# Patient Record
Sex: MALE | Race: ASIAN | NOT HISPANIC OR LATINO | URBAN - METROPOLITAN AREA
[De-identification: names, ages, dates, MRNs, and addresses within clinical notes are randomized per-mention and may not be internally consistent; named-entity substitution may affect disease eponyms.]

---

## 2017-08-01 ENCOUNTER — OUTPATIENT (OUTPATIENT)
Dept: OUTPATIENT SERVICES | Facility: HOSPITAL | Age: 67
LOS: 1 days | Discharge: HOME | End: 2017-08-01

## 2017-08-07 DIAGNOSIS — I10 ESSENTIAL (PRIMARY) HYPERTENSION: ICD-10-CM

## 2017-08-07 DIAGNOSIS — I25.10 ATHEROSCLEROTIC HEART DISEASE OF NATIVE CORONARY ARTERY WITHOUT ANGINA PECTORIS: ICD-10-CM

## 2017-08-07 DIAGNOSIS — E78.00 PURE HYPERCHOLESTEROLEMIA, UNSPECIFIED: ICD-10-CM

## 2017-08-07 DIAGNOSIS — N52.8 OTHER MALE ERECTILE DYSFUNCTION: ICD-10-CM

## 2017-08-07 DIAGNOSIS — Z85.038 PERSONAL HISTORY OF OTHER MALIGNANT NEOPLASM OF LARGE INTESTINE: ICD-10-CM

## 2017-08-07 DIAGNOSIS — N48.6 INDURATION PENIS PLASTICA: ICD-10-CM

## 2017-08-07 DIAGNOSIS — E11.9 TYPE 2 DIABETES MELLITUS WITHOUT COMPLICATIONS: ICD-10-CM

## 2017-08-07 DIAGNOSIS — Z87.891 PERSONAL HISTORY OF NICOTINE DEPENDENCE: ICD-10-CM

## 2018-10-08 ENCOUNTER — EMERGENCY (EMERGENCY)
Facility: HOSPITAL | Age: 68
LOS: 1 days | Discharge: HOME | End: 2018-10-08
Attending: EMERGENCY MEDICINE
Payer: MEDICARE

## 2018-10-08 VITALS
SYSTOLIC BLOOD PRESSURE: 171 MMHG | WEIGHT: 160.06 LBS | DIASTOLIC BLOOD PRESSURE: 89 MMHG | HEART RATE: 54 BPM | TEMPERATURE: 98 F | HEIGHT: 64 IN | OXYGEN SATURATION: 95 % | RESPIRATION RATE: 18 BRPM

## 2018-10-08 VITALS
DIASTOLIC BLOOD PRESSURE: 90 MMHG | RESPIRATION RATE: 16 BRPM | HEART RATE: 54 BPM | OXYGEN SATURATION: 98 % | TEMPERATURE: 98 F | SYSTOLIC BLOOD PRESSURE: 165 MMHG

## 2018-10-08 PROCEDURE — 72125 CT NECK SPINE W/O DYE: CPT

## 2018-10-08 PROCEDURE — 70450 CT HEAD/BRAIN W/O DYE: CPT

## 2018-10-08 PROCEDURE — 96372 THER/PROPH/DIAG INJ SC/IM: CPT

## 2018-10-08 PROCEDURE — 99284 EMERGENCY DEPT VISIT MOD MDM: CPT | Mod: 25

## 2018-10-08 PROCEDURE — 70450 CT HEAD/BRAIN W/O DYE: CPT | Mod: 26

## 2018-10-08 PROCEDURE — 99284 EMERGENCY DEPT VISIT MOD MDM: CPT

## 2018-10-08 PROCEDURE — 72125 CT NECK SPINE W/O DYE: CPT | Mod: 26

## 2018-10-08 RX ORDER — ACETAMINOPHEN 500 MG
975 TABLET ORAL ONCE
Qty: 0 | Refills: 0 | Status: COMPLETED | OUTPATIENT
Start: 2018-10-08 | End: 2018-10-08

## 2018-10-08 RX ORDER — KETOROLAC TROMETHAMINE 30 MG/ML
15 SYRINGE (ML) INJECTION ONCE
Qty: 0 | Refills: 0 | Status: DISCONTINUED | OUTPATIENT
Start: 2018-10-08 | End: 2018-10-08

## 2018-10-08 RX ADMIN — Medication 975 MILLIGRAM(S): at 14:52

## 2018-10-08 RX ADMIN — Medication 15 MILLIGRAM(S): at 17:08

## 2018-10-08 NOTE — ED ADULT NURSE NOTE - NSIMPLEMENTINTERV_GEN_ALL_ED
Implemented All Fall Risk Interventions:  [place] to call system. Call bell, personal items and telephone within reach. Instruct patient to call for assistance. Room bathroom lighting operational. Non-slip footwear when patient is off stretcher. Physically safe environment: no spills, clutter or unnecessary equipment. Stretcher in lowest position, wheels locked, appropriate side rails in place. Provide visual cue, wrist band, yellow gown, etc. Monitor gait and stability. Monitor for mental status changes and reorient to person, place, and time. Review medications for side effects contributing to fall risk. Reinforce activity limits and safety measures with patient and family.

## 2018-10-08 NOTE — ED PROVIDER NOTE - PLAN OF CARE
1) Please return to the ED should you have any new or worsening symptoms, worsening pain, develop any concerning symptoms  2) Please follow up with your primary care doctor in 2-3 days.

## 2018-10-08 NOTE — ED ADULT NURSE NOTE - OBJECTIVE STATEMENT
66 y/o male presenting to the ED via EMS form home complaining of headache and shoulder pain s/p fall. Patient states was playing tennis when he tripped and fell back hitting his head. Patient unsure of LOC. Patient takes 81 mg of aspirin daily. Patient was able to drive home after incident but pain became worse prompting ED visit. On arrival patient appears to be resting comfortably in stretcher. Pain 4/10 in posterior head and bilateral shoulder. No hematoma noted to posterior head. Denies chest pain, dizziness, vision changes, SOB, n/v/d, numbness, tingling. A&OX3. Safety and comfort measures provided. Spouse at bedside. 66 y/o male presenting to the ED via EMS form home complaining of headache and shoulder pain s/p fall. Patient states was playing tennis when he tripped and fell back hitting his head. Patient unsure of LOC. Patient takes 81 mg of aspirin daily. Patient was able to drive home after incident but pain became worse prompting ED visit. On arrival patient appears to be resting comfortably in stretcher. Pain 4/10 in posterior head and bilateral shoulder. No hematoma noted to posterior head. Denies chest pain, dizziness, vision changes, SOB, n/v/d, numbness, tingling. C-collar put in place. A&OX3. Safety and comfort measures provided. Spouse at bedside.

## 2018-10-08 NOTE — ED ADULT TRIAGE NOTE - CHIEF COMPLAINT QUOTE
s/p mechanical fall around 11am today, hit head/ c/o dizziness and headache, denies LOC/ takes aspirin

## 2018-10-08 NOTE — ED ADULT NURSE REASSESSMENT NOTE - NS ED NURSE REASSESS COMMENT FT1
Patient ambulated in ED. Steady gait noted. Patient complaining of pain soreness in neck. Patient medicated at this time. ED MD Marciano CHANDLER states okay to discharge patient at this time. A&OX3. Safety and comfort measures provided. Spouse at bedside.

## 2018-10-08 NOTE — ED PROVIDER NOTE - MUSCULOSKELETAL, MLM
Strength appropriate for age. Full active range of motion of all 4 extremities. Tenderness over c3-4 - placed in C-collar.

## 2018-10-08 NOTE — ED PROVIDER NOTE - CARE PLAN
Principal Discharge DX:	Neck pain  Assessment and plan of treatment:	1) Please return to the ED should you have any new or worsening symptoms, worsening pain, develop any concerning symptoms  2) Please follow up with your primary care doctor in 2-3 days.

## 2018-10-08 NOTE — ED PROVIDER NOTE - MEDICAL DECISION MAKING DETAILS
Marciano Travis (Resident): 66 y/o w/ mechanical fall from standing, fell backwards - unknown LOC - headache and dizziness at this tiem w/ C-spine tenderness - will place C-collar, CT head and C- spine and

## 2018-10-08 NOTE — ED PROVIDER NOTE - OBJECTIVE STATEMENT
68 y/o male hx of HTN on ASA (last dose last night) presents with head injury. Was playing tennis at 11:00 am and fell backwards, unknown LOC. States came in now b/c of headache and neck pain. No N/v/D, abd pain, vision changes. No Cp or palpitations prior to syncope.

## 2018-10-08 NOTE — ED PROVIDER NOTE - ATTENDING CONTRIBUTION TO CARE
Dr. Lay (Attending Physician)  Minor head injury with fall from standing, mechanical, with cervical pain with paraspinal tenderness >midline tenderness.  Will CT head and neck. Pain control and reassess.

## 2018-11-06 NOTE — ED PROVIDER NOTE - EYES, MLM
How Severe Is Your Condition?: moderate Clear bilaterally, pupils equal, round and reactive to light. EOMI. No scleral icterus. No conjunctival injection. No discharge bilaterally. No nystagmus appreciated bilaterally.

## 2021-03-02 NOTE — ED ADULT NURSE NOTE - CHPI ED NUR SEVERITY2
A ([employer] Certitude Introducer Sheath Set Ref# [id number], 18 F, [employer]) catheter was used to engage and inject the right subclavian artery by hand injection.   X3, 30 total  PAIN SCALE 4 OF 10.

## 2023-05-22 ENCOUNTER — INPATIENT (INPATIENT)
Facility: HOSPITAL | Age: 73
LOS: 3 days | Discharge: HOME | DRG: 711 | End: 2023-05-26
Attending: UROLOGY | Admitting: UROLOGY
Payer: MEDICARE

## 2023-05-22 VITALS
TEMPERATURE: 98 F | HEIGHT: 64 IN | DIASTOLIC BLOOD PRESSURE: 78 MMHG | OXYGEN SATURATION: 98 % | SYSTOLIC BLOOD PRESSURE: 116 MMHG | RESPIRATION RATE: 20 BRPM | HEART RATE: 68 BPM | WEIGHT: 130.07 LBS

## 2023-05-22 DIAGNOSIS — Z96.89 PRESENCE OF OTHER SPECIFIED FUNCTIONAL IMPLANTS: Chronic | ICD-10-CM

## 2023-05-22 DIAGNOSIS — N43.1 INFECTED HYDROCELE: ICD-10-CM

## 2023-05-22 LAB
ALBUMIN SERPL ELPH-MCNC: 3.8 G/DL — SIGNIFICANT CHANGE UP (ref 3.3–5)
ALP SERPL-CCNC: 59 U/L — SIGNIFICANT CHANGE UP (ref 40–120)
ALT FLD-CCNC: 17 U/L — SIGNIFICANT CHANGE UP (ref 10–45)
ANION GAP SERPL CALC-SCNC: 16 MMOL/L — SIGNIFICANT CHANGE UP (ref 5–17)
APPEARANCE UR: CLEAR — SIGNIFICANT CHANGE UP
APTT BLD: 29.2 SEC — SIGNIFICANT CHANGE UP (ref 27.5–35.5)
AST SERPL-CCNC: 16 U/L — SIGNIFICANT CHANGE UP (ref 10–40)
BASE EXCESS BLDV CALC-SCNC: 2.1 MMOL/L — SIGNIFICANT CHANGE UP (ref -2–3)
BASE EXCESS BLDV CALC-SCNC: 3.4 MMOL/L — HIGH (ref -2–3)
BASOPHILS # BLD AUTO: 0.04 K/UL — SIGNIFICANT CHANGE UP (ref 0–0.2)
BASOPHILS NFR BLD AUTO: 0.5 % — SIGNIFICANT CHANGE UP (ref 0–2)
BILIRUB SERPL-MCNC: 0.2 MG/DL — SIGNIFICANT CHANGE UP (ref 0.2–1.2)
BILIRUB UR-MCNC: NEGATIVE — SIGNIFICANT CHANGE UP
BUN SERPL-MCNC: 26 MG/DL — HIGH (ref 7–23)
CA-I SERPL-SCNC: 1.2 MMOL/L — SIGNIFICANT CHANGE UP (ref 1.15–1.33)
CA-I SERPL-SCNC: 1.23 MMOL/L — SIGNIFICANT CHANGE UP (ref 1.15–1.33)
CALCIUM SERPL-MCNC: 9.4 MG/DL — SIGNIFICANT CHANGE UP (ref 8.4–10.5)
CHLORIDE BLDV-SCNC: 101 MMOL/L — SIGNIFICANT CHANGE UP (ref 96–108)
CHLORIDE BLDV-SCNC: 102 MMOL/L — SIGNIFICANT CHANGE UP (ref 96–108)
CHLORIDE SERPL-SCNC: 99 MMOL/L — SIGNIFICANT CHANGE UP (ref 96–108)
CO2 BLDV-SCNC: 29 MMOL/L — HIGH (ref 22–26)
CO2 BLDV-SCNC: 32 MMOL/L — HIGH (ref 22–26)
CO2 SERPL-SCNC: 24 MMOL/L — SIGNIFICANT CHANGE UP (ref 22–31)
COLOR SPEC: SIGNIFICANT CHANGE UP
CREAT SERPL-MCNC: 1.01 MG/DL — SIGNIFICANT CHANGE UP (ref 0.5–1.3)
DIFF PNL FLD: NEGATIVE — SIGNIFICANT CHANGE UP
EGFR: 80 ML/MIN/1.73M2 — SIGNIFICANT CHANGE UP
EOSINOPHIL # BLD AUTO: 0.04 K/UL — SIGNIFICANT CHANGE UP (ref 0–0.5)
EOSINOPHIL NFR BLD AUTO: 0.5 % — SIGNIFICANT CHANGE UP (ref 0–6)
GAS PNL BLDV: 137 MMOL/L — SIGNIFICANT CHANGE UP (ref 136–145)
GAS PNL BLDV: 137 MMOL/L — SIGNIFICANT CHANGE UP (ref 136–145)
GAS PNL BLDV: SIGNIFICANT CHANGE UP
GLUCOSE BLDC GLUCOMTR-MCNC: 152 MG/DL — HIGH (ref 70–99)
GLUCOSE BLDV-MCNC: 213 MG/DL — HIGH (ref 70–99)
GLUCOSE BLDV-MCNC: 377 MG/DL — HIGH (ref 70–99)
GLUCOSE SERPL-MCNC: 369 MG/DL — HIGH (ref 70–99)
GLUCOSE UR QL: ABNORMAL
HCO3 BLDV-SCNC: 28 MMOL/L — SIGNIFICANT CHANGE UP (ref 22–29)
HCO3 BLDV-SCNC: 30 MMOL/L — HIGH (ref 22–29)
HCT VFR BLD CALC: 34.8 % — LOW (ref 39–50)
HCT VFR BLDA CALC: 33 % — LOW (ref 39–51)
HCT VFR BLDA CALC: 36 % — LOW (ref 39–51)
HGB BLD CALC-MCNC: 11 G/DL — LOW (ref 12.6–17.4)
HGB BLD CALC-MCNC: 11.9 G/DL — LOW (ref 12.6–17.4)
HGB BLD-MCNC: 11.7 G/DL — LOW (ref 13–17)
IMM GRANULOCYTES NFR BLD AUTO: 0.6 % — SIGNIFICANT CHANGE UP (ref 0–0.9)
INR BLD: 1.02 RATIO — SIGNIFICANT CHANGE UP (ref 0.88–1.16)
KETONES UR-MCNC: NEGATIVE — SIGNIFICANT CHANGE UP
LACTATE BLDV-MCNC: 2.2 MMOL/L — HIGH (ref 0.5–2)
LACTATE BLDV-MCNC: 3.2 MMOL/L — HIGH (ref 0.5–2)
LEUKOCYTE ESTERASE UR-ACNC: NEGATIVE — SIGNIFICANT CHANGE UP
LYMPHOCYTES # BLD AUTO: 2.09 K/UL — SIGNIFICANT CHANGE UP (ref 1–3.3)
LYMPHOCYTES # BLD AUTO: 24.5 % — SIGNIFICANT CHANGE UP (ref 13–44)
MCHC RBC-ENTMCNC: 29.1 PG — SIGNIFICANT CHANGE UP (ref 27–34)
MCHC RBC-ENTMCNC: 33.6 GM/DL — SIGNIFICANT CHANGE UP (ref 32–36)
MCV RBC AUTO: 86.6 FL — SIGNIFICANT CHANGE UP (ref 80–100)
MONOCYTES # BLD AUTO: 0.42 K/UL — SIGNIFICANT CHANGE UP (ref 0–0.9)
MONOCYTES NFR BLD AUTO: 4.9 % — SIGNIFICANT CHANGE UP (ref 2–14)
NEUTROPHILS # BLD AUTO: 5.89 K/UL — SIGNIFICANT CHANGE UP (ref 1.8–7.4)
NEUTROPHILS NFR BLD AUTO: 69 % — SIGNIFICANT CHANGE UP (ref 43–77)
NITRITE UR-MCNC: NEGATIVE — SIGNIFICANT CHANGE UP
NRBC # BLD: 0 /100 WBCS — SIGNIFICANT CHANGE UP (ref 0–0)
PCO2 BLDV: 47 MMHG — SIGNIFICANT CHANGE UP (ref 42–55)
PCO2 BLDV: 53 MMHG — SIGNIFICANT CHANGE UP (ref 42–55)
PH BLDV: 7.36 — SIGNIFICANT CHANGE UP (ref 7.32–7.43)
PH BLDV: 7.38 — SIGNIFICANT CHANGE UP (ref 7.32–7.43)
PH UR: 7.5 — SIGNIFICANT CHANGE UP (ref 5–8)
PLATELET # BLD AUTO: 151 K/UL — SIGNIFICANT CHANGE UP (ref 150–400)
PO2 BLDV: 35 MMHG — SIGNIFICANT CHANGE UP (ref 25–45)
PO2 BLDV: 35 MMHG — SIGNIFICANT CHANGE UP (ref 25–45)
POTASSIUM BLDV-SCNC: 4.6 MMOL/L — SIGNIFICANT CHANGE UP (ref 3.5–5.1)
POTASSIUM BLDV-SCNC: 5.1 MMOL/L — SIGNIFICANT CHANGE UP (ref 3.5–5.1)
POTASSIUM SERPL-MCNC: 4.1 MMOL/L — SIGNIFICANT CHANGE UP (ref 3.5–5.3)
POTASSIUM SERPL-SCNC: 4.1 MMOL/L — SIGNIFICANT CHANGE UP (ref 3.5–5.3)
PROT SERPL-MCNC: 7.1 G/DL — SIGNIFICANT CHANGE UP (ref 6–8.3)
PROT UR-MCNC: NEGATIVE — SIGNIFICANT CHANGE UP
PROTHROM AB SERPL-ACNC: 11.7 SEC — SIGNIFICANT CHANGE UP (ref 10.5–13.4)
RBC # BLD: 4.02 M/UL — LOW (ref 4.2–5.8)
RBC # FLD: 13.3 % — SIGNIFICANT CHANGE UP (ref 10.3–14.5)
SAO2 % BLDV: 56 % — LOW (ref 67–88)
SAO2 % BLDV: 59.8 % — LOW (ref 67–88)
SODIUM SERPL-SCNC: 139 MMOL/L — SIGNIFICANT CHANGE UP (ref 135–145)
SP GR SPEC: 1.04 — HIGH (ref 1.01–1.02)
UROBILINOGEN FLD QL: NEGATIVE — SIGNIFICANT CHANGE UP
WBC # BLD: 8.53 K/UL — SIGNIFICANT CHANGE UP (ref 3.8–10.5)
WBC # FLD AUTO: 8.53 K/UL — SIGNIFICANT CHANGE UP (ref 3.8–10.5)

## 2023-05-22 PROCEDURE — 99285 EMERGENCY DEPT VISIT HI MDM: CPT

## 2023-05-22 PROCEDURE — 93975 VASCULAR STUDY: CPT | Mod: 26

## 2023-05-22 PROCEDURE — 74177 CT ABD & PELVIS W/CONTRAST: CPT | Mod: 26,MA

## 2023-05-22 PROCEDURE — 76870 US EXAM SCROTUM: CPT | Mod: 26

## 2023-05-22 RX ORDER — ZOLPIDEM TARTRATE 10 MG/1
5 TABLET ORAL AT BEDTIME
Refills: 0 | Status: DISCONTINUED | OUTPATIENT
Start: 2023-05-22 | End: 2023-05-24

## 2023-05-22 RX ORDER — SIMVASTATIN 20 MG/1
20 TABLET, FILM COATED ORAL AT BEDTIME
Refills: 0 | Status: DISCONTINUED | OUTPATIENT
Start: 2023-05-22 | End: 2023-05-24

## 2023-05-22 RX ORDER — CIPROFLOXACIN LACTATE 400MG/40ML
400 VIAL (ML) INTRAVENOUS EVERY 12 HOURS
Refills: 0 | Status: DISCONTINUED | OUTPATIENT
Start: 2023-05-22 | End: 2023-05-24

## 2023-05-22 RX ORDER — DEXTROSE 50 % IN WATER 50 %
25 SYRINGE (ML) INTRAVENOUS ONCE
Refills: 0 | Status: DISCONTINUED | OUTPATIENT
Start: 2023-05-22 | End: 2023-05-24

## 2023-05-22 RX ORDER — TAMSULOSIN HYDROCHLORIDE 0.4 MG/1
0.4 CAPSULE ORAL AT BEDTIME
Refills: 0 | Status: DISCONTINUED | OUTPATIENT
Start: 2023-05-22 | End: 2023-05-24

## 2023-05-22 RX ORDER — ZOLPIDEM TARTRATE 10 MG/1
1 TABLET ORAL
Refills: 0 | DISCHARGE

## 2023-05-22 RX ORDER — LOSARTAN POTASSIUM 100 MG/1
100 TABLET, FILM COATED ORAL DAILY
Refills: 0 | Status: DISCONTINUED | OUTPATIENT
Start: 2023-05-22 | End: 2023-05-24

## 2023-05-22 RX ORDER — AZELASTINE 137 UG/1
2 SPRAY, METERED NASAL
Refills: 0 | DISCHARGE

## 2023-05-22 RX ORDER — KETOROLAC TROMETHAMINE 30 MG/ML
15 SYRINGE (ML) INJECTION EVERY 8 HOURS
Refills: 0 | Status: DISCONTINUED | OUTPATIENT
Start: 2023-05-22 | End: 2023-05-23

## 2023-05-22 RX ORDER — INSULIN LISPRO 100/ML
VIAL (ML) SUBCUTANEOUS
Refills: 0 | Status: DISCONTINUED | OUTPATIENT
Start: 2023-05-22 | End: 2023-05-24

## 2023-05-22 RX ORDER — SODIUM CHLORIDE 9 MG/ML
1000 INJECTION, SOLUTION INTRAVENOUS ONCE
Refills: 0 | Status: COMPLETED | OUTPATIENT
Start: 2023-05-22 | End: 2023-05-22

## 2023-05-22 RX ORDER — DEXTROSE 50 % IN WATER 50 %
15 SYRINGE (ML) INTRAVENOUS ONCE
Refills: 0 | Status: DISCONTINUED | OUTPATIENT
Start: 2023-05-22 | End: 2023-05-24

## 2023-05-22 RX ORDER — ACETAMINOPHEN 500 MG
1000 TABLET ORAL ONCE
Refills: 0 | Status: COMPLETED | OUTPATIENT
Start: 2023-05-22 | End: 2023-05-22

## 2023-05-22 RX ORDER — OMEPRAZOLE 10 MG/1
1 CAPSULE, DELAYED RELEASE ORAL
Refills: 0 | DISCHARGE

## 2023-05-22 RX ORDER — CEFTRIAXONE 500 MG/1
1000 INJECTION, POWDER, FOR SOLUTION INTRAMUSCULAR; INTRAVENOUS ONCE
Refills: 0 | Status: COMPLETED | OUTPATIENT
Start: 2023-05-22 | End: 2023-05-22

## 2023-05-22 RX ORDER — ICOSAPENT ETHYL 500 MG/1
2 CAPSULE, LIQUID FILLED ORAL
Refills: 0 | DISCHARGE

## 2023-05-22 RX ORDER — SODIUM CHLORIDE 9 MG/ML
1000 INJECTION, SOLUTION INTRAVENOUS
Refills: 0 | Status: DISCONTINUED | OUTPATIENT
Start: 2023-05-22 | End: 2023-05-24

## 2023-05-22 RX ORDER — HYDROCHLOROTHIAZIDE 25 MG
1 TABLET ORAL
Refills: 0 | DISCHARGE

## 2023-05-22 RX ORDER — ACETAMINOPHEN 500 MG
650 TABLET ORAL EVERY 6 HOURS
Refills: 0 | Status: DISCONTINUED | OUTPATIENT
Start: 2023-05-22 | End: 2023-05-24

## 2023-05-22 RX ORDER — NIFEDIPINE 30 MG
60 TABLET, EXTENDED RELEASE 24 HR ORAL DAILY
Refills: 0 | Status: DISCONTINUED | OUTPATIENT
Start: 2023-05-22 | End: 2023-05-24

## 2023-05-22 RX ORDER — SIMVASTATIN 20 MG/1
1 TABLET, FILM COATED ORAL
Refills: 0 | DISCHARGE

## 2023-05-22 RX ORDER — PANTOPRAZOLE SODIUM 20 MG/1
40 TABLET, DELAYED RELEASE ORAL
Refills: 0 | Status: DISCONTINUED | OUTPATIENT
Start: 2023-05-22 | End: 2023-05-24

## 2023-05-22 RX ORDER — EMPAGLIFLOZIN 10 MG/1
1 TABLET, FILM COATED ORAL
Refills: 0 | DISCHARGE

## 2023-05-22 RX ORDER — OLMESARTAN MEDOXOMIL 5 MG/1
1 TABLET, FILM COATED ORAL
Refills: 0 | DISCHARGE

## 2023-05-22 RX ORDER — DEXTROSE 50 % IN WATER 50 %
12.5 SYRINGE (ML) INTRAVENOUS ONCE
Refills: 0 | Status: DISCONTINUED | OUTPATIENT
Start: 2023-05-22 | End: 2023-05-24

## 2023-05-22 RX ORDER — NIFEDIPINE 30 MG
1 TABLET, EXTENDED RELEASE 24 HR ORAL
Refills: 0 | DISCHARGE

## 2023-05-22 RX ORDER — HEPARIN SODIUM 5000 [USP'U]/ML
5000 INJECTION INTRAVENOUS; SUBCUTANEOUS EVERY 8 HOURS
Refills: 0 | Status: DISCONTINUED | OUTPATIENT
Start: 2023-05-22 | End: 2023-05-24

## 2023-05-22 RX ORDER — ASPIRIN/CALCIUM CARB/MAGNESIUM 324 MG
1 TABLET ORAL
Refills: 0 | DISCHARGE

## 2023-05-22 RX ORDER — INSULIN LISPRO 100/ML
VIAL (ML) SUBCUTANEOUS AT BEDTIME
Refills: 0 | Status: DISCONTINUED | OUTPATIENT
Start: 2023-05-22 | End: 2023-05-24

## 2023-05-22 RX ORDER — ASPIRIN/CALCIUM CARB/MAGNESIUM 324 MG
81 TABLET ORAL DAILY
Refills: 0 | Status: DISCONTINUED | OUTPATIENT
Start: 2023-05-22 | End: 2023-05-24

## 2023-05-22 RX ORDER — GLUCAGON INJECTION, SOLUTION 0.5 MG/.1ML
1 INJECTION, SOLUTION SUBCUTANEOUS ONCE
Refills: 0 | Status: DISCONTINUED | OUTPATIENT
Start: 2023-05-22 | End: 2023-05-24

## 2023-05-22 RX ORDER — GLIPIZIDE/METFORMIN HCL 2.5-500 MG
2 TABLET ORAL
Refills: 0 | DISCHARGE

## 2023-05-22 RX ORDER — CIPROFLOXACIN LACTATE 400MG/40ML
400 VIAL (ML) INTRAVENOUS ONCE
Refills: 0 | Status: COMPLETED | OUTPATIENT
Start: 2023-05-22 | End: 2023-05-22

## 2023-05-22 RX ORDER — TAMSULOSIN HYDROCHLORIDE 0.4 MG/1
1 CAPSULE ORAL
Refills: 0 | DISCHARGE

## 2023-05-22 RX ADMIN — TAMSULOSIN HYDROCHLORIDE 0.4 MILLIGRAM(S): 0.4 CAPSULE ORAL at 22:39

## 2023-05-22 RX ADMIN — HEPARIN SODIUM 5000 UNIT(S): 5000 INJECTION INTRAVENOUS; SUBCUTANEOUS at 22:42

## 2023-05-22 RX ADMIN — Medication 400 MILLIGRAM(S): at 12:29

## 2023-05-22 RX ADMIN — SODIUM CHLORIDE 75 MILLILITER(S): 9 INJECTION, SOLUTION INTRAVENOUS at 22:42

## 2023-05-22 RX ADMIN — CEFTRIAXONE 100 MILLIGRAM(S): 500 INJECTION, POWDER, FOR SOLUTION INTRAMUSCULAR; INTRAVENOUS at 16:41

## 2023-05-22 RX ADMIN — Medication 1000 MILLIGRAM(S): at 13:00

## 2023-05-22 RX ADMIN — Medication 200 MILLIGRAM(S): at 19:51

## 2023-05-22 RX ADMIN — SIMVASTATIN 20 MILLIGRAM(S): 20 TABLET, FILM COATED ORAL at 22:38

## 2023-05-22 RX ADMIN — Medication 15 MILLIGRAM(S): at 22:40

## 2023-05-22 RX ADMIN — SODIUM CHLORIDE 500 MILLILITER(S): 9 INJECTION, SOLUTION INTRAVENOUS at 12:29

## 2023-05-22 NOTE — H&P ADULT - HISTORY OF PRESENT ILLNESS
72y old Male with PMHx of HTN, DM, colon ca s/p resection, who presents to the ER with worsening pain from his right testicle.  The patient states that he developed redness, swelling, and pain in his right scrotum 3 weeks ago and saw his urologist, Dr Mendoza, who took a urine culture and started him on Keflex for 10 days.  Urine culture grew ecoli.  Despite finishing his course of antibiotics, the patient had persistent right testicular pain and came to the ER.  States that he had some hematuria when the symptoms began.  He denies fevers, chills, N/V, flank pain, dysuria.

## 2023-05-22 NOTE — CONSULT NOTE ADULT - SUBJECTIVE AND OBJECTIVE BOX
UROLOGY CONSULT NOTE    HPI:  This is a 72y old Male with PMHx of HTN, DM, colon ca s/p resection, who presents to the ER with worsening pain from his right testicle.  The patient states that he developed redness, swelling, and pain in his right scrotum 3 weeks ago and saw his urologist, Dr Mendoza, who took a urine culture and started him on Keflex for 10 days.  Urine culture grew ecoli.  Despite finishing his course of antibiotics, the patient had persistent right testicular pain and came to the ER.  States that he had some hematuria when the symptoms began.  He denies fevers, chills, N/V, flank pain, dysuria.      PAST MEDICAL HISTORY    Colon cancer    High blood pressure    Diabetes        PAST SURGICAL HISTORY    History of implantation of penile prosthesis        FAMILY HISTORY    noncontributory    SOCIAL HISTORY        HOME MEDICATIONS        DRUG ALLERGIES    No Known Allergies      REVIEW OF SYSTEMS: Pertinent positives and negatives as stated in HPI, otherwise negative      VITAL SIGNS    T(F): 98, Max: 98 (05-22-23 @ 15:15)  HR: 64  BP: 122/74  RR: 16  SpO2: 98%    I's & O's        PHYSICAL EXAM    Gen: Well groomed, well dressed, well nourished  Abd: Soft, NT/ND  : +R testicle tender, +erythema, no fluctuance, no crepitus; + IPP pump palpated in left scrotum  Ext: No edema present b/l      LABS:                        11.7   8.53  )-----------( 151               34.8     139  |  99  |  26  ----------------------------<  369  4.1   |  24  |  1.01    Ca    9.4    TPro  7.1  /  Alb  3.8  /  TBili  0.2  /  DBili  x   /  AST  16  /  ALT  17  /  AlkPhos  59    PT: 11.7 sec;   INR: 1.02 ratio  PTT:29.2 sec      Urine culture: none    Blood culture: pending results      IMAGING:      CT: No acute abdominopelvic findings. No findings of malignancy within the   abdomen or pelvis.    Suggest dedicated ultrasound imaging of the scrotum/testicles, which were   not imaged on this exam.    Enlarged prostate.      Testicular US: Right epididymoorchitis with a 4 cm pyocele.

## 2023-05-22 NOTE — H&P ADULT - NSHPPHYSICALEXAM_GEN_ALL_CORE
VITAL SIGNS    T(F): 98, Max: 98 (05-22-23 @ 15:15)  HR: 64  BP: 122/74  RR: 16  SpO2: 98%    I's & O's      PHYSICAL EXAM    Gen: Well groomed, well dressed, well nourished  Abd: Soft, NT/ND  : +R testicle tender, +erythema, no fluctuance, no crepitus; + IPP pump palpated in left scrotum  Ext: No edema present b/l

## 2023-05-22 NOTE — ED ADULT NURSE NOTE - OBJECTIVE STATEMENT
72 year old male patient presents ambulatory to ED c/o R testicular pain x 3 weeks. Patient primarily Lithuanian speaking with  used. Patient reports pain, redness, swelling and hematuria, abdominal pain initially. Patient states he went to his urologist, Dr. Rico when symptoms first started and was prescribed an antibiotic which he completed, and has a follow up on Thursday. Patient states bleeding resolved but swelling and pain worsened. Patient also reports unintentional 10-15lb weight loss over the past month along with fatigue. Denies current CP, abd pain, n/v/d, fever, chills. Patient aware of plan of care for US and CT. Patient advised to request  if needed.

## 2023-05-22 NOTE — H&P ADULT - NSICDXPASTSURGICALHX_GEN_ALL_CORE_FT
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

## 2023-05-22 NOTE — ED PROVIDER NOTE - PROGRESS NOTE DETAILS
Attending MD Candelaria: Spoke with Dr. Randolph Mendoza's office [phone] [address] and spoke with Lia.  Patient was seen on 5/4/23 for gross hematuria with urethral pain and dysuria.  At that time, was found to have mild scrotal erythema without crepitus, fluctuance or induration. Urine culture was +for E. Coli.  Rx'ed Keflex TID x 1 week.  Reports was told to return for cystoscopy.  Reports is scheduled to return on Thursday. Pam, PGY2: Patient found with pyocele on US. Spoke with Urology for recommendations regarding management.

## 2023-05-22 NOTE — H&P ADULT - ASSESSMENT
72 year old male with IPP here with right epididymitis with a 4cm pyocele    admit   IV abx  f/u cultures  trend wbc, vitals  OR planning for R orchiectomy, possible R IPP explant -> Wednesday

## 2023-05-22 NOTE — ED PROVIDER NOTE - CLINICAL SUMMARY MEDICAL DECISION MAKING FREE TEXT BOX
72-year-old female patient presents to the emergency department for 3 weeks of worsening right groin pain associated with edema, erythema.  Patient recently found with positive urine cultures growing E. coli and was prescribed Keflex.  On exam, found with right groin tenderness to palpation, edema, erythema.  Exam concerning for mass, torsion, infection.  Will evaluate CT, ultrasound, urine sample and blood work

## 2023-05-22 NOTE — CONSULT NOTE ADULT - ASSESSMENT
72 year old male with right epididmytis with a 4cm pyocele    -  -  -   72 year old male with right epididymitis with a 4cm pyocele    - IV abx  - f/u cultures  - monitor scrotal exam  - trend wbc, vitals

## 2023-05-22 NOTE — ED PROVIDER NOTE - PHYSICAL EXAMINATION
GENERAL: Awake, alert, NAD  HEENT: NC/AT, moist mucous membranes, EOMI  LUNGS: CTAB, no wheezes or crackles   CARDIAC: RRR, no m/r/g  ABDOMEN: Soft to palpation, surgical scars noted over lower abdomen and appear well-healed.  Nontender abdomen.  : Right scrotal edema and erythema noted.  Right scrotum tender to palpation.  Right groin mass palpated.  Penile prosthesis noted.   BACK: no CVA tenderness  EXT: No edema, no calf tenderness, no deformities.  NEURO: A&Ox3. Moving all extremities.  PSYCH: Normal affect.

## 2023-05-22 NOTE — ED PROVIDER NOTE - ATTENDING CONTRIBUTION TO CARE
Attending MD Candelaria: I personally have seen and examined this patient.  Resident note reviewed and agree on plan of care and except where noted.  See below for details.     Seen in [place] [facility]    72M with PMH/PSH including HTN, DM, colon ca s/p resection presents to the ED with scrotal edema and erythema for three weeks.  Reports was seen by his urologist, Dr. Mendoza, started on Keflex but not improved.  Denies difficulty urinating, hematuia, burning on urination or ant Denes trauma, fall.  Reports pain and swelling at scrotus.  Possible subjective fevers.      Exam:   General: NAD  HENT: head NCAT, airway patent  Eyes: anicteric, no conjunctival injection   Lungs: lungs CTAB with good inspiratory effort, no wheezing, no rhonchi, no rales  Cardiac: +S1S2, no obvious m/r/g  GI: abdomen soft with +BS, NT, ND  : no CVAT, R scrotal edema and erythema, no crepitus  MSK: ranging neck and extremities freely  Neuro: moving all extremities spontaneously, nonfocal  Psych: normal mood and affect     A/p: 72M with R scrotal edema and swelling, concern for cellulitis, low suspicion for Lamonte's, will obtain CTAP with IV to eval for signs of in

## 2023-05-22 NOTE — H&P ADULT - NSHPLABSRESULTS_GEN_ALL_CORE
11.7   8.53  )-----------( 151      ( 22 May 2023 12:38 )             34.8     05-22    139  |  99  |  26<H>  ----------------------------<  369<H>  4.1   |  24  |  1.01    Ca    9.4      22 May 2023 12:38    TPro  7.1  /  Alb  3.8  /  TBili  0.2  /  DBili  x   /  AST  16  /  ALT  17  /  AlkPhos  59  05-22    UA/UCx pending  BCx pending

## 2023-05-22 NOTE — ED PROVIDER NOTE - OBJECTIVE STATEMENT
72-year-old male patient past medical history of high blood pressure, diabetes, colon cancer status postresection who presents to the emergency department for 3 weeks of worsening right scrotal erythema and edema associated with pain.  Follow-up with Dr. Mendoza (urology).  Patient has seen his urologist 3 weeks ago during onset of right groin swelling and pain and was found with positive urine culture for E. coli.  Patient was given Keflex 3 times daily and p.o. pain meds with minimal improvement of his symptoms.  Patient denies any flank pain, fevers, nausea, vomiting.  Does endorse however hematuria during the first week of onset.  Which has since improved.

## 2023-05-22 NOTE — ED ADULT TRIAGE NOTE - CHIEF COMPLAINT QUOTE
hematuria and testicular pain/swelling x3 weeks ago, hematuria resolved after 4 days  testicular pain/swelling to scrotum worsening  prescribed abx by urologist with no relief

## 2023-05-23 ENCOUNTER — TRANSCRIPTION ENCOUNTER (OUTPATIENT)
Age: 73
End: 2023-05-23

## 2023-05-23 LAB
A1C WITH ESTIMATED AVERAGE GLUCOSE RESULT: 10.8 % — HIGH (ref 4–5.6)
ANION GAP SERPL CALC-SCNC: 14 MMOL/L — SIGNIFICANT CHANGE UP (ref 5–17)
BASOPHILS # BLD AUTO: 0.04 K/UL — SIGNIFICANT CHANGE UP (ref 0–0.2)
BASOPHILS NFR BLD AUTO: 0.5 % — SIGNIFICANT CHANGE UP (ref 0–2)
BUN SERPL-MCNC: 28 MG/DL — HIGH (ref 7–23)
CALCIUM SERPL-MCNC: 9.2 MG/DL — SIGNIFICANT CHANGE UP (ref 8.4–10.5)
CHLORIDE SERPL-SCNC: 102 MMOL/L — SIGNIFICANT CHANGE UP (ref 96–108)
CO2 SERPL-SCNC: 26 MMOL/L — SIGNIFICANT CHANGE UP (ref 22–31)
CREAT SERPL-MCNC: 1.13 MG/DL — SIGNIFICANT CHANGE UP (ref 0.5–1.3)
CULTURE RESULTS: SIGNIFICANT CHANGE UP
EGFR: 69 ML/MIN/1.73M2 — SIGNIFICANT CHANGE UP
EOSINOPHIL # BLD AUTO: 0.04 K/UL — SIGNIFICANT CHANGE UP (ref 0–0.5)
EOSINOPHIL NFR BLD AUTO: 0.5 % — SIGNIFICANT CHANGE UP (ref 0–6)
ESTIMATED AVERAGE GLUCOSE: 263 MG/DL — HIGH (ref 68–114)
GLUCOSE BLDC GLUCOMTR-MCNC: 250 MG/DL — HIGH (ref 70–99)
GLUCOSE BLDC GLUCOMTR-MCNC: 274 MG/DL — HIGH (ref 70–99)
GLUCOSE BLDC GLUCOMTR-MCNC: 285 MG/DL — HIGH (ref 70–99)
GLUCOSE BLDC GLUCOMTR-MCNC: 310 MG/DL — HIGH (ref 70–99)
GLUCOSE SERPL-MCNC: 211 MG/DL — HIGH (ref 70–99)
HCT VFR BLD CALC: 34.7 % — LOW (ref 39–50)
HGB BLD-MCNC: 11.1 G/DL — LOW (ref 13–17)
IMM GRANULOCYTES NFR BLD AUTO: 0.4 % — SIGNIFICANT CHANGE UP (ref 0–0.9)
LYMPHOCYTES # BLD AUTO: 2.42 K/UL — SIGNIFICANT CHANGE UP (ref 1–3.3)
LYMPHOCYTES # BLD AUTO: 32.3 % — SIGNIFICANT CHANGE UP (ref 13–44)
MCHC RBC-ENTMCNC: 28.1 PG — SIGNIFICANT CHANGE UP (ref 27–34)
MCHC RBC-ENTMCNC: 32 GM/DL — SIGNIFICANT CHANGE UP (ref 32–36)
MCV RBC AUTO: 87.8 FL — SIGNIFICANT CHANGE UP (ref 80–100)
MONOCYTES # BLD AUTO: 0.43 K/UL — SIGNIFICANT CHANGE UP (ref 0–0.9)
MONOCYTES NFR BLD AUTO: 5.7 % — SIGNIFICANT CHANGE UP (ref 2–14)
NEUTROPHILS # BLD AUTO: 4.53 K/UL — SIGNIFICANT CHANGE UP (ref 1.8–7.4)
NEUTROPHILS NFR BLD AUTO: 60.6 % — SIGNIFICANT CHANGE UP (ref 43–77)
NRBC # BLD: 0 /100 WBCS — SIGNIFICANT CHANGE UP (ref 0–0)
PLATELET # BLD AUTO: 145 K/UL — LOW (ref 150–400)
POTASSIUM SERPL-MCNC: 4.2 MMOL/L — SIGNIFICANT CHANGE UP (ref 3.5–5.3)
POTASSIUM SERPL-SCNC: 4.2 MMOL/L — SIGNIFICANT CHANGE UP (ref 3.5–5.3)
RBC # BLD: 3.95 M/UL — LOW (ref 4.2–5.8)
RBC # FLD: 13.1 % — SIGNIFICANT CHANGE UP (ref 10.3–14.5)
SODIUM SERPL-SCNC: 142 MMOL/L — SIGNIFICANT CHANGE UP (ref 135–145)
SPECIMEN SOURCE: SIGNIFICANT CHANGE UP
WBC # BLD: 7.49 K/UL — SIGNIFICANT CHANGE UP (ref 3.8–10.5)
WBC # FLD AUTO: 7.49 K/UL — SIGNIFICANT CHANGE UP (ref 3.8–10.5)

## 2023-05-23 PROCEDURE — 71045 X-RAY EXAM CHEST 1 VIEW: CPT | Mod: 26

## 2023-05-23 RX ORDER — INSULIN LISPRO 100/ML
3 VIAL (ML) SUBCUTANEOUS
Refills: 0 | Status: DISCONTINUED | OUTPATIENT
Start: 2023-05-23 | End: 2023-05-24

## 2023-05-23 RX ADMIN — HEPARIN SODIUM 5000 UNIT(S): 5000 INJECTION INTRAVENOUS; SUBCUTANEOUS at 21:06

## 2023-05-23 RX ADMIN — Medication 650 MILLIGRAM(S): at 21:33

## 2023-05-23 RX ADMIN — Medication 200 MILLIGRAM(S): at 17:28

## 2023-05-23 RX ADMIN — HEPARIN SODIUM 5000 UNIT(S): 5000 INJECTION INTRAVENOUS; SUBCUTANEOUS at 13:06

## 2023-05-23 RX ADMIN — Medication 650 MILLIGRAM(S): at 01:58

## 2023-05-23 RX ADMIN — Medication 200 MILLIGRAM(S): at 06:12

## 2023-05-23 RX ADMIN — Medication 3: at 17:29

## 2023-05-23 RX ADMIN — Medication 4: at 11:49

## 2023-05-23 RX ADMIN — LOSARTAN POTASSIUM 100 MILLIGRAM(S): 100 TABLET, FILM COATED ORAL at 06:10

## 2023-05-23 RX ADMIN — Medication 15 MILLIGRAM(S): at 21:38

## 2023-05-23 RX ADMIN — Medication 650 MILLIGRAM(S): at 06:10

## 2023-05-23 RX ADMIN — Medication 1: at 21:35

## 2023-05-23 RX ADMIN — Medication 650 MILLIGRAM(S): at 11:49

## 2023-05-23 RX ADMIN — Medication 81 MILLIGRAM(S): at 11:49

## 2023-05-23 RX ADMIN — PANTOPRAZOLE SODIUM 40 MILLIGRAM(S): 20 TABLET, DELAYED RELEASE ORAL at 06:10

## 2023-05-23 RX ADMIN — SIMVASTATIN 20 MILLIGRAM(S): 20 TABLET, FILM COATED ORAL at 21:05

## 2023-05-23 RX ADMIN — Medication 650 MILLIGRAM(S): at 12:49

## 2023-05-23 RX ADMIN — Medication 15 MILLIGRAM(S): at 07:27

## 2023-05-23 RX ADMIN — HEPARIN SODIUM 5000 UNIT(S): 5000 INJECTION INTRAVENOUS; SUBCUTANEOUS at 06:10

## 2023-05-23 RX ADMIN — Medication 650 MILLIGRAM(S): at 23:53

## 2023-05-23 RX ADMIN — TAMSULOSIN HYDROCHLORIDE 0.4 MILLIGRAM(S): 0.4 CAPSULE ORAL at 21:05

## 2023-05-23 RX ADMIN — Medication 650 MILLIGRAM(S): at 17:56

## 2023-05-23 RX ADMIN — Medication 650 MILLIGRAM(S): at 07:27

## 2023-05-23 RX ADMIN — Medication 15 MILLIGRAM(S): at 13:06

## 2023-05-23 RX ADMIN — Medication 15 MILLIGRAM(S): at 06:10

## 2023-05-23 RX ADMIN — Medication 15 MILLIGRAM(S): at 15:31

## 2023-05-23 RX ADMIN — SODIUM CHLORIDE 75 MILLILITER(S): 9 INJECTION, SOLUTION INTRAVENOUS at 21:32

## 2023-05-23 RX ADMIN — Medication 2: at 07:49

## 2023-05-23 RX ADMIN — Medication 650 MILLIGRAM(S): at 17:29

## 2023-05-23 RX ADMIN — Medication 3 UNIT(S): at 17:29

## 2023-05-23 NOTE — CONSULT NOTE ADULT - SUBJECTIVE AND OBJECTIVE BOX
HPI:  72y old Male with PMHx of HTN, DM, colon ca s/p resection, who presents to the ER with worsening pain from his right testicle.  The patient states that he developed redness, swelling, and pain in his right scrotum 3 weeks ago and saw his urologist, Dr Mendoza, who took a urine culture and started him on Keflex for 10 days.  Urine culture grew ecoli.  Despite finishing his course of antibiotics, the patient had persistent right testicular pain and came to the ER.  States that he had some hematuria when the symptoms began.  He denies fevers, chills, N/V, flank pain, dysuria. (22 May 2023 18:41)      PAST MEDICAL & SURGICAL HISTORY:  Colon cancer      High blood pressure      Diabetes      History of implantation of penile prosthesis          Review of Systems:   CONSTITUTIONAL: No fever, weight loss, or fatigue  EYES: No eye pain, visual disturbances, or discharge  ENMT:  No difficulty hearing, tinnitus, vertigo; No sinus or throat pain  NECK: No pain or stiffness  BREASTS: No pain, masses, or nipple discharge  RESPIRATORY: No cough, wheezing, chills or hemoptysis; No shortness of breath  CARDIOVASCULAR: No chest pain, palpitations, dizziness, or leg swelling  GASTROINTESTINAL: No abdominal or epigastric pain. No nausea, vomiting, or hematemesis; No diarrhea or constipation. No melena or hematochezia.  GENITOURINARY: No dysuria, frequency, hematuria, or incontinence  NEUROLOGICAL: No headaches, memory loss, loss of strength, numbness, or tremors  SKIN: No itching, burning, rashes, or lesions   LYMPH NODES: No enlarged glands  ENDOCRINE: No heat or cold intolerance; No hair loss  MUSCULOSKELETAL: No joint pain or swelling; No muscle, back, or extremity pain  PSYCHIATRIC: No depression, anxiety, mood swings, or difficulty sleeping  HEME/LYMPH: No easy bruising, or bleeding gums  ALLERY AND IMMUNOLOGIC: No hives or eczema    Allergies    No Known Allergies    Intolerances        Social History:     FAMILY HISTORY:      MEDICATIONS  (STANDING):  acetaminophen     Tablet .. 650 milliGRAM(s) Oral every 6 hours  aspirin enteric coated 81 milliGRAM(s) Oral daily  ciprofloxacin   IVPB 400 milliGRAM(s) IV Intermittent every 12 hours  dextrose 5%. 1000 milliLiter(s) (100 mL/Hr) IV Continuous <Continuous>  dextrose 5%. 1000 milliLiter(s) (50 mL/Hr) IV Continuous <Continuous>  dextrose 50% Injectable 25 Gram(s) IV Push once  dextrose 50% Injectable 12.5 Gram(s) IV Push once  dextrose 50% Injectable 25 Gram(s) IV Push once  glucagon  Injectable 1 milliGRAM(s) IntraMuscular once  heparin   Injectable 5000 Unit(s) SubCutaneous every 8 hours  hydrochlorothiazide 25 milliGRAM(s) Oral daily  insulin lispro (ADMELOG) corrective regimen sliding scale   SubCutaneous three times a day before meals  insulin lispro (ADMELOG) corrective regimen sliding scale   SubCutaneous at bedtime  ketorolac   Injectable 15 milliGRAM(s) IV Push every 8 hours  lactated ringers. 1000 milliLiter(s) (75 mL/Hr) IV Continuous <Continuous>  losartan 100 milliGRAM(s) Oral daily  NIFEdipine XL 60 milliGRAM(s) Oral daily  pantoprazole    Tablet 40 milliGRAM(s) Oral before breakfast  simvastatin 20 milliGRAM(s) Oral at bedtime  tamsulosin 0.4 milliGRAM(s) Oral at bedtime    MEDICATIONS  (PRN):  dextrose Oral Gel 15 Gram(s) Oral once PRN Blood Glucose LESS THAN 70 milliGRAM(s)/deciliter  zolpidem 5 milliGRAM(s) Oral at bedtime PRN Insomnia        CAPILLARY BLOOD GLUCOSE      POCT Blood Glucose.: 310 mg/dL (23 May 2023 11:36)  POCT Blood Glucose.: 250 mg/dL (23 May 2023 07:24)  POCT Blood Glucose.: 152 mg/dL (22 May 2023 22:20)    I&O's Summary    22 May 2023 07:01  -  23 May 2023 07:00  --------------------------------------------------------  IN: 0 mL / OUT: 400 mL / NET: -400 mL        PHYSICAL EXAM:  GENERAL: NAD, well-developed  HEAD:  Atraumatic, Normocephalic  EYES: EOMI, PERRLA, conjunctiva and sclera clear  NECK: Supple, No JVD  CHEST/LUNG: Clear to auscultation bilaterally; No wheeze  HEART: Regular rate and rhythm; No murmurs, rubs, or gallops  ABDOMEN: Soft, Nontender, Nondistended; Bowel sounds present  EXTREMITIES:  2+ Peripheral Pulses, No clubbing, cyanosis, or edema  PSYCH: AAOx3  NEUROLOGY: non-focal  SKIN: No rashes or lesions    LABS:                        11.1   7.49  )-----------( 145      ( 23 May 2023 07:43 )             34.7         142  |  102  |  28<H>  ----------------------------<  211<H>  4.2   |  26  |  1.13    Ca    9.2      23 May 2023 07:43    TPro  7.1  /  Alb  3.8  /  TBili  0.2  /  DBili  x   /  AST  16  /  ALT  17  /  AlkPhos  59  05    PT/INR - ( 22 May 2023 12:38 )   PT: 11.7 sec;   INR: 1.02 ratio         PTT - ( 22 May 2023 12:38 )  PTT:29.2 sec      Urinalysis Basic - ( 22 May 2023 22:25 )    Color: Light Yellow / Appearance: Clear / S.036 / pH: x  Gluc: x / Ketone: Negative  / Bili: Negative / Urobili: Negative   Blood: x / Protein: Negative / Nitrite: Negative   Leuk Esterase: Negative / RBC: x / WBC x   Sq Epi: x / Non Sq Epi: x / Bacteria: x        RADIOLOGY & ADDITIONAL TESTS:    Imaging Personally Reviewed:    Consultant(s) Notes Reviewed:      Care Discussed with Consultants/Other Providers:

## 2023-05-23 NOTE — CHART NOTE - NSCHARTNOTEFT_GEN_A_CORE
Urology Preop Note    Patient is a 72y old  Male who presents with a chief complaint of     Diagnosis: right epididymitis, pyocele  Procedure: right simple orchiectomy, possible IPP explant  Surgeon: Dr Darlene Fermin                              11.1   7.49  )-----------( 145      ( 23 May 2023 07:43 )             34.7       142  |  102  |  28<H>  ----------------------------<  211<H>  4.2   |  26  |  1.13    Ca    9.2      23 May 2023 07:43    TPro  7.1  /  Alb  3.8  /  TBili  0.2  /  DBili  x   /  AST  16  /  ALT  17  /  AlkPhos  59  05-22      PT/INR - ( 22 May 2023 12:38 )   PT: 11.7 sec;   INR: 1.02 ratio         PTT - ( 22 May 2023 12:38 )  PTT:29.2 sec    Urinalysis Basic - ( 22 May 2023 22:25 )    Color: Light Yellow / Appearance: Clear / S.036 / pH: x  Gluc: x / Ketone: Negative  / Bili: Negative / Urobili: Negative   Blood: x / Protein: Negative / Nitrite: Negative   Leuk Esterase: Negative / RBC: x / WBC x   Sq Epi: x / Non Sq Epi: x / Bacteria: x        Urine Culture: pending results    Chest X-ray: pending    EKG: pending    Orders: NPO/IVF after midnight    Consent: in patient's chart pending attending signature / pending    [ ] Clearance - pending medical clearance Urology Preop Note    Patient is a 72y old  Male who presents with a chief complaint of     Diagnosis: right epididymitis, pyocele  Procedure: right simple orchiectomy, possible IPP explant  Surgeon: Dr Darlene Fermin                              11.1   7.49  )-----------( 145      ( 23 May 2023 07:43 )             34.7       142  |  102  |  28<H>  ----------------------------<  211<H>  4.2   |  26  |  1.13    Ca    9.2      23 May 2023 07:43    TPro  7.1  /  Alb  3.8  /  TBili  0.2  /  DBili  x   /  AST  16  /  ALT  17  /  AlkPhos  59  05-22      PT/INR - ( 22 May 2023 12:38 )   PT: 11.7 sec;   INR: 1.02 ratio         PTT - ( 22 May 2023 12:38 )  PTT:29.2 sec    Urinalysis Basic - ( 22 May 2023 22:25 )    Color: Light Yellow / Appearance: Clear / S.036 / pH: x  Gluc: x / Ketone: Negative  / Bili: Negative / Urobili: Negative   Blood: x / Protein: Negative / Nitrite: Negative   Leuk Esterase: Negative / RBC: x / WBC x   Sq Epi: x / Non Sq Epi: x / Bacteria: x        Urine Culture: pending results    Chest X-ray: pending    EKG: pending    Orders: NPO/IVF after midnight    Consent: in patient's chart pending attending signature / pending    [x] Clearance - medically cleared

## 2023-05-23 NOTE — PROGRESS NOTE ADULT - ASSESSMENT
72 year old male with right epididymitis and a 4cm pyocele    -continue antibiotics  -tight glucose control  -analgesia prn  -preop and consent for right simple orchiectomy possible IPP explant tomorrow  -f/u cultures  -OOB/DVT prophylaxis/incentive spirometry

## 2023-05-23 NOTE — PROGRESS NOTE ADULT - SUBJECTIVE AND OBJECTIVE BOX
The patient was seen and examined at bedside.  No acute events overnight and the patient is without acute complaints this AM.    T(C): 36.7 (05-23-23 @ 12:16), Max: 36.9 (05-23-23 @ 08:42)  HR: 61 (05-23-23 @ 12:16) (55 - 84)  BP: 128/74 (05-23-23 @ 12:16) (110/67 - 131/74)  RR: 18 (05-23-23 @ 12:16) (16 - 20)  SpO2: 95% (05-23-23 @ 12:16) (95% - 98%)  Wt(kg): --    Physical Exam:    General: NAD, A+Ox3  Abdomen: soft, non-tender, non-distended  : +right scrotal erythema and tenderness; pump palpated mid scrotum      05-22 @ 07:01  -  05-23 @ 07:00  --------------------------------------------------------  IN: 0 mL / OUT: 400 mL / NET: -400 mL      void - 400cc                            11.1   7.49  )-----------( 145               34.7     142  |  102  |  28  ----------------------------<  211  4.2   |  26  |  1.13    Ca    9.2    TPro  7.1  /  Alb  3.8  /  TBili  0.2  /  DBili  x   /  AST  16  /  ALT  17  /  AlkPhos  59

## 2023-05-23 NOTE — CONSULT NOTE ADULT - ASSESSMENT
2y old Male with PMHx of HTN, DM, colon ca s/p resection, who presents to the ER with worsening pain from his right testicle.  The patient states that he developed redness, swelling, and pain in his right scrotum 3 weeks ago and saw his urologist, Dr Mendoza, who took a urine culture and started him on Keflex for 10 days.  Urine culture grew ecoli    DM Follow up A1C sent   HISS   Add pre meal  3 units  2y old Male with PMHx of HTN, DM, colon ca s/p resection, who presents to the ER with worsening pain from his right testicle.  The patient states that he developed redness, swelling, and pain in his right scrotum 3 weeks ago and saw his urologist, Dr Mendoza, who took a urine culture and started him on Keflex for 10 days.  Urine culture grew ecoli     Right epididymitis and a 4cm pyocele  Plan for Rt Orchiectomy and IPP Explant     iv abx   Follow up Urine Blood cx   Patient afebrile     HTN Continue with Losartan, HCTZ and Nifedepine  BP controlled       DM Follow up A1C sent   HISS   Add pre meal  3 units     'Patient with no cardiac history    Medically optimized

## 2023-05-24 ENCOUNTER — RESULT REVIEW (OUTPATIENT)
Age: 73
End: 2023-05-24

## 2023-05-24 LAB
ANION GAP SERPL CALC-SCNC: 11 MMOL/L — SIGNIFICANT CHANGE UP (ref 5–17)
BUN SERPL-MCNC: 24 MG/DL — HIGH (ref 7–23)
CALCIUM SERPL-MCNC: 9.1 MG/DL — SIGNIFICANT CHANGE UP (ref 8.4–10.5)
CHLORIDE SERPL-SCNC: 102 MMOL/L — SIGNIFICANT CHANGE UP (ref 96–108)
CO2 SERPL-SCNC: 25 MMOL/L — SIGNIFICANT CHANGE UP (ref 22–31)
CREAT SERPL-MCNC: 1.04 MG/DL — SIGNIFICANT CHANGE UP (ref 0.5–1.3)
EGFR: 77 ML/MIN/1.73M2 — SIGNIFICANT CHANGE UP
GLUCOSE BLDC GLUCOMTR-MCNC: 142 MG/DL — HIGH (ref 70–99)
GLUCOSE BLDC GLUCOMTR-MCNC: 217 MG/DL — HIGH (ref 70–99)
GLUCOSE BLDC GLUCOMTR-MCNC: 220 MG/DL — HIGH (ref 70–99)
GLUCOSE BLDC GLUCOMTR-MCNC: 220 MG/DL — HIGH (ref 70–99)
GLUCOSE BLDC GLUCOMTR-MCNC: 351 MG/DL — HIGH (ref 70–99)
GLUCOSE BLDC GLUCOMTR-MCNC: 416 MG/DL — HIGH (ref 70–99)
GLUCOSE BLDC GLUCOMTR-MCNC: 442 MG/DL — HIGH (ref 70–99)
GLUCOSE SERPL-MCNC: 199 MG/DL — HIGH (ref 70–99)
GRAM STN FLD: SIGNIFICANT CHANGE UP
HCT VFR BLD CALC: 32.2 % — LOW (ref 39–50)
HCV AB S/CO SERPL IA: 0.09 S/CO — SIGNIFICANT CHANGE UP (ref 0–0.99)
HCV AB SERPL-IMP: SIGNIFICANT CHANGE UP
HGB BLD-MCNC: 10.9 G/DL — LOW (ref 13–17)
MCHC RBC-ENTMCNC: 28.7 PG — SIGNIFICANT CHANGE UP (ref 27–34)
MCHC RBC-ENTMCNC: 33.9 GM/DL — SIGNIFICANT CHANGE UP (ref 32–36)
MCV RBC AUTO: 84.7 FL — SIGNIFICANT CHANGE UP (ref 80–100)
NRBC # BLD: 0 /100 WBCS — SIGNIFICANT CHANGE UP (ref 0–0)
PLATELET # BLD AUTO: 137 K/UL — LOW (ref 150–400)
POTASSIUM SERPL-MCNC: 3.9 MMOL/L — SIGNIFICANT CHANGE UP (ref 3.5–5.3)
POTASSIUM SERPL-SCNC: 3.9 MMOL/L — SIGNIFICANT CHANGE UP (ref 3.5–5.3)
RBC # BLD: 3.8 M/UL — LOW (ref 4.2–5.8)
RBC # FLD: 13 % — SIGNIFICANT CHANGE UP (ref 10.3–14.5)
SODIUM SERPL-SCNC: 138 MMOL/L — SIGNIFICANT CHANGE UP (ref 135–145)
SPECIMEN SOURCE: SIGNIFICANT CHANGE UP
WBC # BLD: 5.92 K/UL — SIGNIFICANT CHANGE UP (ref 3.8–10.5)
WBC # FLD AUTO: 5.92 K/UL — SIGNIFICANT CHANGE UP (ref 3.8–10.5)

## 2023-05-24 PROCEDURE — 88312 SPECIAL STAINS GROUP 1: CPT | Mod: 26

## 2023-05-24 PROCEDURE — 54520 REMOVAL OF TESTIS: CPT | Mod: RT

## 2023-05-24 PROCEDURE — 88305 TISSUE EXAM BY PATHOLOGIST: CPT | Mod: 26

## 2023-05-24 PROCEDURE — 88342 IMHCHEM/IMCYTCHM 1ST ANTB: CPT | Mod: 26

## 2023-05-24 PROCEDURE — 54700 I&D EPDIDYMS TSTIS&/SCROT SP: CPT | Mod: RT

## 2023-05-24 PROCEDURE — 88341 IMHCHEM/IMCYTCHM EA ADD ANTB: CPT | Mod: 26

## 2023-05-24 RX ORDER — INSULIN LISPRO 100/ML
VIAL (ML) SUBCUTANEOUS AT BEDTIME
Refills: 0 | Status: DISCONTINUED | OUTPATIENT
Start: 2023-05-24 | End: 2023-05-24

## 2023-05-24 RX ORDER — GLUCAGON INJECTION, SOLUTION 0.5 MG/.1ML
1 INJECTION, SOLUTION SUBCUTANEOUS ONCE
Refills: 0 | Status: DISCONTINUED | OUTPATIENT
Start: 2023-05-24 | End: 2023-05-26

## 2023-05-24 RX ORDER — SODIUM CHLORIDE 9 MG/ML
1000 INJECTION, SOLUTION INTRAVENOUS
Refills: 0 | Status: DISCONTINUED | OUTPATIENT
Start: 2023-05-24 | End: 2023-05-24

## 2023-05-24 RX ORDER — DEXTROSE 50 % IN WATER 50 %
25 SYRINGE (ML) INTRAVENOUS ONCE
Refills: 0 | Status: DISCONTINUED | OUTPATIENT
Start: 2023-05-24 | End: 2023-05-26

## 2023-05-24 RX ORDER — FENTANYL CITRATE 50 UG/ML
50 INJECTION INTRAVENOUS
Refills: 0 | Status: DISCONTINUED | OUTPATIENT
Start: 2023-05-24 | End: 2023-05-24

## 2023-05-24 RX ORDER — FENTANYL CITRATE 50 UG/ML
25 INJECTION INTRAVENOUS
Refills: 0 | Status: DISCONTINUED | OUTPATIENT
Start: 2023-05-24 | End: 2023-05-24

## 2023-05-24 RX ORDER — TAMSULOSIN HYDROCHLORIDE 0.4 MG/1
0.4 CAPSULE ORAL AT BEDTIME
Refills: 0 | Status: DISCONTINUED | OUTPATIENT
Start: 2023-05-24 | End: 2023-05-26

## 2023-05-24 RX ORDER — DEXTROSE 50 % IN WATER 50 %
15 SYRINGE (ML) INTRAVENOUS ONCE
Refills: 0 | Status: DISCONTINUED | OUTPATIENT
Start: 2023-05-24 | End: 2023-05-24

## 2023-05-24 RX ORDER — LANOLIN ALCOHOL/MO/W.PET/CERES
3 CREAM (GRAM) TOPICAL AT BEDTIME
Refills: 0 | Status: DISCONTINUED | OUTPATIENT
Start: 2023-05-24 | End: 2023-05-24

## 2023-05-24 RX ORDER — ONDANSETRON 8 MG/1
4 TABLET, FILM COATED ORAL EVERY 4 HOURS
Refills: 0 | Status: DISCONTINUED | OUTPATIENT
Start: 2023-05-24 | End: 2023-05-24

## 2023-05-24 RX ORDER — HEPARIN SODIUM 5000 [USP'U]/ML
5000 INJECTION INTRAVENOUS; SUBCUTANEOUS EVERY 8 HOURS
Refills: 0 | Status: DISCONTINUED | OUTPATIENT
Start: 2023-05-24 | End: 2023-05-26

## 2023-05-24 RX ORDER — ASPIRIN/CALCIUM CARB/MAGNESIUM 324 MG
81 TABLET ORAL DAILY
Refills: 0 | Status: DISCONTINUED | OUTPATIENT
Start: 2023-05-24 | End: 2023-05-26

## 2023-05-24 RX ORDER — PANTOPRAZOLE SODIUM 20 MG/1
40 TABLET, DELAYED RELEASE ORAL
Refills: 0 | Status: DISCONTINUED | OUTPATIENT
Start: 2023-05-24 | End: 2023-05-26

## 2023-05-24 RX ORDER — OXYCODONE HYDROCHLORIDE 5 MG/1
5 TABLET ORAL EVERY 6 HOURS
Refills: 0 | Status: DISCONTINUED | OUTPATIENT
Start: 2023-05-24 | End: 2023-05-24

## 2023-05-24 RX ORDER — VANCOMYCIN HCL 1 G
1000 VIAL (EA) INTRAVENOUS ONCE
Refills: 0 | Status: COMPLETED | OUTPATIENT
Start: 2023-05-24 | End: 2023-05-24

## 2023-05-24 RX ORDER — NIFEDIPINE 30 MG
60 TABLET, EXTENDED RELEASE 24 HR ORAL DAILY
Refills: 0 | Status: DISCONTINUED | OUTPATIENT
Start: 2023-05-24 | End: 2023-05-26

## 2023-05-24 RX ORDER — SIMVASTATIN 20 MG/1
20 TABLET, FILM COATED ORAL AT BEDTIME
Refills: 0 | Status: DISCONTINUED | OUTPATIENT
Start: 2023-05-24 | End: 2023-05-26

## 2023-05-24 RX ORDER — DEXTROSE 50 % IN WATER 50 %
12.5 SYRINGE (ML) INTRAVENOUS ONCE
Refills: 0 | Status: DISCONTINUED | OUTPATIENT
Start: 2023-05-24 | End: 2023-05-26

## 2023-05-24 RX ORDER — LANOLIN ALCOHOL/MO/W.PET/CERES
3 CREAM (GRAM) TOPICAL AT BEDTIME
Refills: 0 | Status: DISCONTINUED | OUTPATIENT
Start: 2023-05-24 | End: 2023-05-26

## 2023-05-24 RX ORDER — ACETAMINOPHEN 500 MG
650 TABLET ORAL EVERY 6 HOURS
Refills: 0 | Status: DISCONTINUED | OUTPATIENT
Start: 2023-05-24 | End: 2023-05-26

## 2023-05-24 RX ORDER — DEXTROSE 50 % IN WATER 50 %
15 SYRINGE (ML) INTRAVENOUS ONCE
Refills: 0 | Status: DISCONTINUED | OUTPATIENT
Start: 2023-05-24 | End: 2023-05-26

## 2023-05-24 RX ORDER — OXYCODONE HYDROCHLORIDE 5 MG/1
10 TABLET ORAL EVERY 6 HOURS
Refills: 0 | Status: DISCONTINUED | OUTPATIENT
Start: 2023-05-24 | End: 2023-05-26

## 2023-05-24 RX ORDER — LOSARTAN POTASSIUM 100 MG/1
100 TABLET, FILM COATED ORAL DAILY
Refills: 0 | Status: DISCONTINUED | OUTPATIENT
Start: 2023-05-24 | End: 2023-05-26

## 2023-05-24 RX ORDER — SODIUM CHLORIDE 9 MG/ML
1000 INJECTION, SOLUTION INTRAVENOUS
Refills: 0 | Status: DISCONTINUED | OUTPATIENT
Start: 2023-05-24 | End: 2023-05-26

## 2023-05-24 RX ORDER — OXYCODONE HYDROCHLORIDE 5 MG/1
5 TABLET ORAL EVERY 4 HOURS
Refills: 0 | Status: DISCONTINUED | OUTPATIENT
Start: 2023-05-24 | End: 2023-05-26

## 2023-05-24 RX ORDER — ZOLPIDEM TARTRATE 10 MG/1
5 TABLET ORAL AT BEDTIME
Refills: 0 | Status: DISCONTINUED | OUTPATIENT
Start: 2023-05-24 | End: 2023-05-26

## 2023-05-24 RX ORDER — SODIUM CHLORIDE 9 MG/ML
1000 INJECTION, SOLUTION INTRAVENOUS
Refills: 0 | Status: DISCONTINUED | OUTPATIENT
Start: 2023-05-24 | End: 2023-05-25

## 2023-05-24 RX ORDER — CIPROFLOXACIN LACTATE 400MG/40ML
400 VIAL (ML) INTRAVENOUS EVERY 12 HOURS
Refills: 0 | Status: DISCONTINUED | OUTPATIENT
Start: 2023-05-24 | End: 2023-05-26

## 2023-05-24 RX ORDER — INSULIN LISPRO 100/ML
VIAL (ML) SUBCUTANEOUS
Refills: 0 | Status: DISCONTINUED | OUTPATIENT
Start: 2023-05-24 | End: 2023-05-26

## 2023-05-24 RX ORDER — INSULIN LISPRO 100/ML
VIAL (ML) SUBCUTANEOUS EVERY 6 HOURS
Refills: 0 | Status: DISCONTINUED | OUTPATIENT
Start: 2023-05-24 | End: 2023-05-24

## 2023-05-24 RX ORDER — INSULIN LISPRO 100/ML
VIAL (ML) SUBCUTANEOUS
Refills: 0 | Status: DISCONTINUED | OUTPATIENT
Start: 2023-05-24 | End: 2023-05-24

## 2023-05-24 RX ADMIN — Medication 200 MILLIGRAM(S): at 05:37

## 2023-05-24 RX ADMIN — Medication 2: at 17:11

## 2023-05-24 RX ADMIN — Medication 12: at 21:50

## 2023-05-24 RX ADMIN — Medication 60 MILLIGRAM(S): at 05:36

## 2023-05-24 RX ADMIN — SODIUM CHLORIDE 75 MILLILITER(S): 9 INJECTION, SOLUTION INTRAVENOUS at 01:08

## 2023-05-24 RX ADMIN — Medication 2: at 06:15

## 2023-05-24 RX ADMIN — Medication 2: at 15:18

## 2023-05-24 RX ADMIN — ZOLPIDEM TARTRATE 5 MILLIGRAM(S): 10 TABLET ORAL at 00:19

## 2023-05-24 RX ADMIN — Medication 650 MILLIGRAM(S): at 16:56

## 2023-05-24 RX ADMIN — SODIUM CHLORIDE 75 MILLILITER(S): 9 INJECTION, SOLUTION INTRAVENOUS at 11:18

## 2023-05-24 RX ADMIN — Medication 650 MILLIGRAM(S): at 17:50

## 2023-05-24 RX ADMIN — Medication 3 MILLIGRAM(S): at 21:50

## 2023-05-24 RX ADMIN — SODIUM CHLORIDE 125 MILLILITER(S): 9 INJECTION, SOLUTION INTRAVENOUS at 22:41

## 2023-05-24 RX ADMIN — OXYCODONE HYDROCHLORIDE 10 MILLIGRAM(S): 5 TABLET ORAL at 23:09

## 2023-05-24 RX ADMIN — OXYCODONE HYDROCHLORIDE 5 MILLIGRAM(S): 5 TABLET ORAL at 01:38

## 2023-05-24 RX ADMIN — Medication 250 MILLIGRAM(S): at 11:18

## 2023-05-24 RX ADMIN — Medication 650 MILLIGRAM(S): at 05:36

## 2023-05-24 RX ADMIN — HEPARIN SODIUM 5000 UNIT(S): 5000 INJECTION INTRAVENOUS; SUBCUTANEOUS at 05:37

## 2023-05-24 RX ADMIN — OXYCODONE HYDROCHLORIDE 5 MILLIGRAM(S): 5 TABLET ORAL at 01:08

## 2023-05-24 RX ADMIN — OXYCODONE HYDROCHLORIDE 10 MILLIGRAM(S): 5 TABLET ORAL at 22:39

## 2023-05-24 RX ADMIN — LOSARTAN POTASSIUM 100 MILLIGRAM(S): 100 TABLET, FILM COATED ORAL at 05:36

## 2023-05-24 RX ADMIN — Medication 200 MILLIGRAM(S): at 17:00

## 2023-05-24 RX ADMIN — SIMVASTATIN 20 MILLIGRAM(S): 20 TABLET, FILM COATED ORAL at 21:50

## 2023-05-24 RX ADMIN — PANTOPRAZOLE SODIUM 40 MILLIGRAM(S): 20 TABLET, DELAYED RELEASE ORAL at 05:36

## 2023-05-24 RX ADMIN — TAMSULOSIN HYDROCHLORIDE 0.4 MILLIGRAM(S): 0.4 CAPSULE ORAL at 21:49

## 2023-05-24 RX ADMIN — Medication 650 MILLIGRAM(S): at 23:14

## 2023-05-24 RX ADMIN — OXYCODONE HYDROCHLORIDE 5 MILLIGRAM(S): 5 TABLET ORAL at 18:15

## 2023-05-24 RX ADMIN — HEPARIN SODIUM 5000 UNIT(S): 5000 INJECTION INTRAVENOUS; SUBCUTANEOUS at 21:49

## 2023-05-24 RX ADMIN — OXYCODONE HYDROCHLORIDE 5 MILLIGRAM(S): 5 TABLET ORAL at 19:00

## 2023-05-24 NOTE — PROGRESS NOTE ADULT - ASSESSMENT
A/P: 72y Male s/p   DVT prophylaxis/OOB  Incentive spirometry  Strict I&O's  Analgesia and antiemetics as needed  Diet  AM labs A/P: 72y Male s/p scrotal exploration, right orchiectomy   DVT prophylaxis/OOB  Incentive spirometry  Strict I&O's  Analgesia and antiemetics as needed  Diet-regular   AM labs

## 2023-05-24 NOTE — PATIENT PROFILE ADULT - NSTRANSFERBELONGINGSRESP_GEN_A_NUR
-- DO NOT REPLY / DO NOT REPLY ALL --  -- Message is from the [facility]--    General Patient Message      Reason for Call: [facility] called in because they need a home health order faxed to   [phone]  And any questions please call     Caller Information       Type Contact Phone    10/14/2021 10:08 AM CDT Phone (Incoming) [facility]  (Provider) [phone]     [facility]          Alternative phone number: none    Turnaround time given to caller:   \"This message will be sent to [state Provider's name]. The clinical team will fulfill your request as soon as they review your message.\"     yes

## 2023-05-24 NOTE — PROGRESS NOTE ADULT - ASSESSMENT
72 year old male with right epididymitis and a 4cm pyocele.    Plan:  -OR today for right simple orchiectomy possible IPP explant tomorrow  -continue antibiotics  -tight glucose control  -analgesia prn  -f/u cultures  -OOB/DVT prophylaxis/incentive spirometry

## 2023-05-24 NOTE — PROVIDER CONTACT NOTE (OTHER) - ASSESSMENT
Patient A&Ox4. Vitals stable. No complaint of distress at this time. Resting comfortably. Patient states he had something to eat after arrival to floor from PACU but has not had any food intake since then.

## 2023-05-24 NOTE — PROGRESS NOTE ADULT - SUBJECTIVE AND OBJECTIVE BOX
Post op Check    Pt seen and examined without complaints. Pain is controlled. Denies SOB/CP/N/V.     Vital Signs Last 24 Hrs  T(C): 36.8 (24 May 2023 19:01), Max: 37 (24 May 2023 05:22)  T(F): 98.3 (24 May 2023 19:01), Max: 98.6 (24 May 2023 05:22)  HR: 79 (24 May 2023 19:01) (57 - 79)  BP: 122/61 (24 May 2023 19:01) (97/61 - 164/77)  BP(mean): 84 (24 May 2023 16:30) (77 - 102)  RR: 18 (24 May 2023 19:01) (16 - 18)  SpO2: 98% (24 May 2023 19:01) (93% - 99%)    Parameters below as of 24 May 2023 19:01  Patient On (Oxygen Delivery Method): room air        I&O's Summary    23 May 2023 07:01  -  24 May 2023 07:00  --------------------------------------------------------  IN: 900 mL / OUT: 600 mL / NET: 300 mL    24 May 2023 07:01  -  24 May 2023 20:30  --------------------------------------------------------  IN: 480 mL / OUT: 200 mL / NET: 280 mL    Physical Exam  Gen: NAD, A&Ox3  Pulm: No respiratory distress, no subcostal retractions  CV: RRR, no JVD  Abd: Soft, NT, ND  Back:   :                           10.9   5.92  )-----------( 137      ( 24 May 2023 07:03 )             32.2       05-24    138  |  102  |  24<H>  ----------------------------<  199<H>  3.9   |  25  |  1.04    Ca    9.1      24 May 2023 07:03            Post op Check    Pt seen and examined without complaints. Pain is controlled. Denies SOB/CP/N/V.     Vital Signs Last 24 Hrs  T(C): 36.8 (24 May 2023 19:01), Max: 37 (24 May 2023 05:22)  T(F): 98.3 (24 May 2023 19:01), Max: 98.6 (24 May 2023 05:22)  HR: 79 (24 May 2023 19:01) (57 - 79)  BP: 122/61 (24 May 2023 19:01) (97/61 - 164/77)  BP(mean): 84 (24 May 2023 16:30) (77 - 102)  RR: 18 (24 May 2023 19:01) (16 - 18)  SpO2: 98% (24 May 2023 19:01) (93% - 99%)    Parameters below as of 24 May 2023 19:01  Patient On (Oxygen Delivery Method): room air    I&O's Summary    23 May 2023 07:01  -  24 May 2023 07:00  --------------------------------------------------------  IN: 900 mL / OUT: 600 mL / NET: 300 mL    24 May 2023 07:01  -  24 May 2023 20:30  --------------------------------------------------------  IN: 480 mL / OUT: 200 mL / NET: 280 mL    Physical Exam  Gen: NAD, A&Ox3  Pulm: No respiratory distress, no subcostal retractions  CV: RRR, no JVD  Abd: Soft, NT, ND  Back: no CVAT  : circumcised, no lesions.  No discharge or blood at urethral meatus. palpable IPP. Scrotal support in place, L testicle nontender, right scrotal steristrips c/d/i, fluffs with scant serosanguinous drainage from penrose  MSK:  No CVAT, Moving all extremities, full ROM in all extremities, No edema present  NEURO: A&Ox3, no focal neurological deficits, CN 2-12 grossly intact  SKIN: warm, dry, no rash.                           10.9   5.92  )-----------( 137      ( 24 May 2023 07:03 )             32.2       05-24    138  |  102  |  24<H>  ----------------------------<  199<H>  3.9   |  25  |  1.04    Ca    9.1      24 May 2023 07:03

## 2023-05-24 NOTE — PROVIDER CONTACT NOTE (OTHER) - ACTION/TREATMENT ORDERED:
Insulin sliding scale adjusted and 12 units insulin coverage administered. Fingerstick rechecked 20 minutes after coverage and resulted [id number]. As per provider, recheck again in 30 minutes.

## 2023-05-24 NOTE — PROGRESS NOTE ADULT - SUBJECTIVE AND OBJECTIVE BOX
Subjective:  No acute overnight events.  Patient seen and examined at bedside with urology team during morning rounds. Patient states pain is improved.   Denies fever/chills, dysuria, urgency, frequency.     General: NAD  Abdomen: soft, non-tender, non-distended  : +right scrotal erythema and tenderness; pump palpated mid scrotum      T(C): 37 (05-24-23 @ 05:22), Max: 37 (05-24-23 @ 05:22)  HR: 59 (05-24-23 @ 05:22) (54 - 61)  BP: 164/77 (05-24-23 @ 05:22) (128/74 - 170/72)  RR: 18 (05-24-23 @ 05:22) (17 - 18)  SpO2: 97% (05-24-23 @ 05:22) (94% - 97%)      05-22-23 @ 07:01  -  05-23-23 @ 07:00  --------------------------------------------------------  IN: 0 mL / OUT: 400 mL / NET: -400 mL    05-23-23 @ 07:01  -  05-24-23 @ 06:46  --------------------------------------------------------  IN: 900 mL / OUT: 600 mL / NET: 300 mL        LABS:  cret                        11.1   7.49  )-----------( 145      ( 23 May 2023 07:43 )             34.7     05-23    142  |  102  |  28<H>  ----------------------------<  211<H>  4.2   |  26  |  1.13    Ca    9.2      23 May 2023 07:43    TPro  7.1  /  Alb  3.8  /  TBili  0.2  /  DBili  x   /  AST  16  /  ALT  17  /  AlkPhos  59  05-22    PT/INR - ( 22 May 2023 12:38 )   PT: 11.7 sec;   INR: 1.02 ratio         PTT - ( 22 May 2023 12:38 )  PTT:29.2 sec      acetaminophen     Tablet .. 650 milliGRAM(s) Oral every 6 hours  aspirin enteric coated 81 milliGRAM(s) Oral daily  ciprofloxacin   IVPB 400 milliGRAM(s) IV Intermittent every 12 hours  dextrose 5%. 1000 milliLiter(s) IV Continuous <Continuous>  dextrose 5%. 1000 milliLiter(s) IV Continuous <Continuous>  dextrose 5%. 1000 milliLiter(s) IV Continuous <Continuous>  dextrose 50% Injectable 25 Gram(s) IV Push once  dextrose 50% Injectable 12.5 Gram(s) IV Push once  dextrose 50% Injectable 25 Gram(s) IV Push once  dextrose Oral Gel 15 Gram(s) Oral once PRN  dextrose Oral Gel 15 Gram(s) Oral once PRN  glucagon  Injectable 1 milliGRAM(s) IntraMuscular once  heparin   Injectable 5000 Unit(s) SubCutaneous every 8 hours  hydrochlorothiazide 25 milliGRAM(s) Oral daily  insulin lispro (ADMELOG) corrective regimen sliding scale   SubCutaneous every 6 hours  insulin lispro Injectable (ADMELOG) 3 Unit(s) SubCutaneous three times a day before meals  lactated ringers. 1000 milliLiter(s) IV Continuous <Continuous>  losartan 100 milliGRAM(s) Oral daily  melatonin 3 milliGRAM(s) Oral at bedtime  NIFEdipine XL 60 milliGRAM(s) Oral daily  oxyCODONE    IR 5 milliGRAM(s) Oral every 6 hours PRN  pantoprazole    Tablet 40 milliGRAM(s) Oral before breakfast  simvastatin 20 milliGRAM(s) Oral at bedtime  tamsulosin 0.4 milliGRAM(s) Oral at bedtime  zolpidem 5 milliGRAM(s) Oral at bedtime PRN

## 2023-05-25 LAB
ANION GAP SERPL CALC-SCNC: 14 MMOL/L — SIGNIFICANT CHANGE UP (ref 5–17)
BUN SERPL-MCNC: 21 MG/DL — SIGNIFICANT CHANGE UP (ref 7–23)
CALCIUM SERPL-MCNC: 9.1 MG/DL — SIGNIFICANT CHANGE UP (ref 8.4–10.5)
CHLORIDE SERPL-SCNC: 101 MMOL/L — SIGNIFICANT CHANGE UP (ref 96–108)
CO2 SERPL-SCNC: 24 MMOL/L — SIGNIFICANT CHANGE UP (ref 22–31)
CREAT SERPL-MCNC: 1.06 MG/DL — SIGNIFICANT CHANGE UP (ref 0.5–1.3)
EGFR: 75 ML/MIN/1.73M2 — SIGNIFICANT CHANGE UP
GLUCOSE BLDC GLUCOMTR-MCNC: 159 MG/DL — HIGH (ref 70–99)
GLUCOSE BLDC GLUCOMTR-MCNC: 218 MG/DL — HIGH (ref 70–99)
GLUCOSE BLDC GLUCOMTR-MCNC: 280 MG/DL — HIGH (ref 70–99)
GLUCOSE BLDC GLUCOMTR-MCNC: 310 MG/DL — HIGH (ref 70–99)
GLUCOSE BLDC GLUCOMTR-MCNC: 332 MG/DL — HIGH (ref 70–99)
GLUCOSE SERPL-MCNC: 180 MG/DL — HIGH (ref 70–99)
GRAM STN FLD: SIGNIFICANT CHANGE UP
HCT VFR BLD CALC: 32.6 % — LOW (ref 39–50)
HGB BLD-MCNC: 10.8 G/DL — LOW (ref 13–17)
MCHC RBC-ENTMCNC: 28.9 PG — SIGNIFICANT CHANGE UP (ref 27–34)
MCHC RBC-ENTMCNC: 33.1 GM/DL — SIGNIFICANT CHANGE UP (ref 32–36)
MCV RBC AUTO: 87.2 FL — SIGNIFICANT CHANGE UP (ref 80–100)
NRBC # BLD: 0 /100 WBCS — SIGNIFICANT CHANGE UP (ref 0–0)
PLATELET # BLD AUTO: 149 K/UL — LOW (ref 150–400)
POTASSIUM SERPL-MCNC: 4.2 MMOL/L — SIGNIFICANT CHANGE UP (ref 3.5–5.3)
POTASSIUM SERPL-SCNC: 4.2 MMOL/L — SIGNIFICANT CHANGE UP (ref 3.5–5.3)
RBC # BLD: 3.74 M/UL — LOW (ref 4.2–5.8)
RBC # FLD: 13.2 % — SIGNIFICANT CHANGE UP (ref 10.3–14.5)
SODIUM SERPL-SCNC: 139 MMOL/L — SIGNIFICANT CHANGE UP (ref 135–145)
SPECIMEN SOURCE: SIGNIFICANT CHANGE UP
WBC # BLD: 6.91 K/UL — SIGNIFICANT CHANGE UP (ref 3.8–10.5)
WBC # FLD AUTO: 6.91 K/UL — SIGNIFICANT CHANGE UP (ref 3.8–10.5)

## 2023-05-25 RX ORDER — INSULIN GLARGINE 100 [IU]/ML
12 INJECTION, SOLUTION SUBCUTANEOUS AT BEDTIME
Refills: 0 | Status: DISCONTINUED | OUTPATIENT
Start: 2023-05-25 | End: 2023-05-26

## 2023-05-25 RX ORDER — POLYETHYLENE GLYCOL 3350 17 G/17G
17 POWDER, FOR SOLUTION ORAL DAILY
Refills: 0 | Status: DISCONTINUED | OUTPATIENT
Start: 2023-05-25 | End: 2023-05-26

## 2023-05-25 RX ORDER — SODIUM CHLORIDE 9 MG/ML
1000 INJECTION, SOLUTION INTRAVENOUS
Refills: 0 | Status: DISCONTINUED | OUTPATIENT
Start: 2023-05-25 | End: 2023-05-25

## 2023-05-25 RX ORDER — INSULIN LISPRO 100/ML
VIAL (ML) SUBCUTANEOUS AT BEDTIME
Refills: 0 | Status: DISCONTINUED | OUTPATIENT
Start: 2023-05-25 | End: 2023-05-26

## 2023-05-25 RX ORDER — INSULIN LISPRO 100/ML
4 VIAL (ML) SUBCUTANEOUS ONCE
Refills: 0 | Status: COMPLETED | OUTPATIENT
Start: 2023-05-25 | End: 2023-05-25

## 2023-05-25 RX ORDER — SENNA PLUS 8.6 MG/1
2 TABLET ORAL AT BEDTIME
Refills: 0 | Status: DISCONTINUED | OUTPATIENT
Start: 2023-05-25 | End: 2023-05-26

## 2023-05-25 RX ORDER — INSULIN LISPRO 100/ML
5 VIAL (ML) SUBCUTANEOUS
Refills: 0 | Status: DISCONTINUED | OUTPATIENT
Start: 2023-05-25 | End: 2023-05-26

## 2023-05-25 RX ADMIN — OXYCODONE HYDROCHLORIDE 10 MILLIGRAM(S): 5 TABLET ORAL at 22:41

## 2023-05-25 RX ADMIN — HEPARIN SODIUM 5000 UNIT(S): 5000 INJECTION INTRAVENOUS; SUBCUTANEOUS at 13:30

## 2023-05-25 RX ADMIN — Medication 4: at 09:37

## 2023-05-25 RX ADMIN — HEPARIN SODIUM 5000 UNIT(S): 5000 INJECTION INTRAVENOUS; SUBCUTANEOUS at 05:23

## 2023-05-25 RX ADMIN — Medication 3 MILLIGRAM(S): at 21:47

## 2023-05-25 RX ADMIN — Medication 5 UNIT(S): at 12:21

## 2023-05-25 RX ADMIN — OXYCODONE HYDROCHLORIDE 10 MILLIGRAM(S): 5 TABLET ORAL at 21:47

## 2023-05-25 RX ADMIN — Medication 4: at 21:45

## 2023-05-25 RX ADMIN — LOSARTAN POTASSIUM 100 MILLIGRAM(S): 100 TABLET, FILM COATED ORAL at 05:24

## 2023-05-25 RX ADMIN — Medication 5 UNIT(S): at 17:37

## 2023-05-25 RX ADMIN — PANTOPRAZOLE SODIUM 40 MILLIGRAM(S): 20 TABLET, DELAYED RELEASE ORAL at 05:24

## 2023-05-25 RX ADMIN — TAMSULOSIN HYDROCHLORIDE 0.4 MILLIGRAM(S): 0.4 CAPSULE ORAL at 21:47

## 2023-05-25 RX ADMIN — SIMVASTATIN 20 MILLIGRAM(S): 20 TABLET, FILM COATED ORAL at 21:47

## 2023-05-25 RX ADMIN — OXYCODONE HYDROCHLORIDE 10 MILLIGRAM(S): 5 TABLET ORAL at 00:00

## 2023-05-25 RX ADMIN — Medication 650 MILLIGRAM(S): at 11:21

## 2023-05-25 RX ADMIN — Medication 4 UNIT(S): at 01:13

## 2023-05-25 RX ADMIN — Medication 650 MILLIGRAM(S): at 05:24

## 2023-05-25 RX ADMIN — OXYCODONE HYDROCHLORIDE 10 MILLIGRAM(S): 5 TABLET ORAL at 11:21

## 2023-05-25 RX ADMIN — Medication 60 MILLIGRAM(S): at 05:24

## 2023-05-25 RX ADMIN — Medication 650 MILLIGRAM(S): at 12:30

## 2023-05-25 RX ADMIN — POLYETHYLENE GLYCOL 3350 17 GRAM(S): 17 POWDER, FOR SOLUTION ORAL at 21:47

## 2023-05-25 RX ADMIN — SENNA PLUS 2 TABLET(S): 8.6 TABLET ORAL at 21:47

## 2023-05-25 RX ADMIN — HEPARIN SODIUM 5000 UNIT(S): 5000 INJECTION INTRAVENOUS; SUBCUTANEOUS at 21:47

## 2023-05-25 RX ADMIN — INSULIN GLARGINE 12 UNIT(S): 100 INJECTION, SOLUTION SUBCUTANEOUS at 21:46

## 2023-05-25 RX ADMIN — Medication 650 MILLIGRAM(S): at 18:00

## 2023-05-25 RX ADMIN — Medication 650 MILLIGRAM(S): at 17:18

## 2023-05-25 RX ADMIN — Medication 81 MILLIGRAM(S): at 11:20

## 2023-05-25 RX ADMIN — Medication 6: at 12:21

## 2023-05-25 RX ADMIN — Medication 200 MILLIGRAM(S): at 05:25

## 2023-05-25 RX ADMIN — Medication 200 MILLIGRAM(S): at 17:18

## 2023-05-25 RX ADMIN — Medication 2: at 17:37

## 2023-05-25 NOTE — PROGRESS NOTE ADULT - SUBJECTIVE AND OBJECTIVE BOX
UROLOGY PA PROGRESS NOTE:     Subjective:  no acute events overnight    Objective:  Vital signs  T(F): , Max: 98.6 (05-25-23 @ 00:29)  HR: 58 (05-25-23 @ 09:25)  BP: 112/64 (05-25-23 @ 09:25)  SpO2: 98% (05-25-23 @ 09:25)  Wt(kg): --    Output     05-24 @ 07:01  -  05-25 @ 07:00  --------------------------------------------------------  IN: 1980 mL / OUT: 600 mL / NET: 1380 mL    05-25 @ 07:01  -  05-25 @ 12:08  --------------------------------------------------------  IN: 580 mL / OUT: 1000 mL / NET: -420 mL    void 400cc    Physical Exam:  Gen: NAD  Abd: soft, NT/ND  : circumcised, no lesions.  No discharge or blood at urethral meatus. palpable IPP. Scrotal support in place, L testicle nontender, right scrotal steristrips c/d/i, fluffs with scant serosanguinous drainage from penrose      Labs:  05-25  6.91  / 32.6  /1.06   05-24  5.92  / 32.2  /1.04                           10.8   6.91  )-----------( 149      ( 25 May 2023 07:14 )             32.6     05-25    139  |  101  |  21  ----------------------------<  180<H>  4.2   |  24  |  1.06    Ca    9.1      25 May 2023 07:14            Urine Cx:    Culture - Body Fluid with Gram Stain (collected 24 May 2023 16:29)  Source: .Body Fluid right pyocele fluid  Gram Stain (24 May 2023 23:34):    Moderate polymorphonuclear leukocytes per low power field    No organisms seen    Culture - Tissue with Gram Stain (collected 24 May 2023 16:29)  Source: .Tissue Other  Gram Stain (25 May 2023 03:24):    Few polymorphonuclear leukocytes seen per low power field    No organisms seen per oil power field    Culture - Urine (collected 22 May 2023 22:25)  Source: Clean Catch Clean Catch (Midstream)  Final Report (23 May 2023 23:04):    <10,000 CFU/mL Normal Urogenital Lia    Culture - Blood (collected 22 May 2023 12:30)  Source: .Blood Blood-Peripheral  Preliminary Report (23 May 2023 18:01):    No growth to date.    Culture - Blood (collected 22 May 2023 12:20)  Source: .Blood Blood-Peripheral  Preliminary Report (23 May 2023 18:01):    No growth to date.

## 2023-05-25 NOTE — PROGRESS NOTE ADULT - ASSESSMENT
A/P: 72y Male s/p scrotal exploration, right orchiectomy , POD#1  DVT prophylaxis/OOB  Incentive spirometry  Strict I&O's  Analgesia and antiemetics as needed  Diet-regular   AM labs  keep penrose in place until final culture results   medicine consulted re: high glucose

## 2023-05-26 ENCOUNTER — TRANSCRIPTION ENCOUNTER (OUTPATIENT)
Age: 73
End: 2023-05-26

## 2023-05-26 VITALS
RESPIRATION RATE: 18 BRPM | HEART RATE: 65 BPM | OXYGEN SATURATION: 97 % | DIASTOLIC BLOOD PRESSURE: 56 MMHG | SYSTOLIC BLOOD PRESSURE: 100 MMHG | TEMPERATURE: 98 F

## 2023-05-26 DIAGNOSIS — I10 ESSENTIAL (PRIMARY) HYPERTENSION: ICD-10-CM

## 2023-05-26 DIAGNOSIS — E11.9 TYPE 2 DIABETES MELLITUS WITHOUT COMPLICATIONS: ICD-10-CM

## 2023-05-26 LAB
GLUCOSE BLDC GLUCOMTR-MCNC: 192 MG/DL — HIGH (ref 70–99)
GLUCOSE BLDC GLUCOMTR-MCNC: 200 MG/DL — HIGH (ref 70–99)

## 2023-05-26 PROCEDURE — 82803 BLOOD GASES ANY COMBINATION: CPT

## 2023-05-26 PROCEDURE — 85014 HEMATOCRIT: CPT

## 2023-05-26 PROCEDURE — 87186 SC STD MICRODIL/AGAR DIL: CPT

## 2023-05-26 PROCEDURE — 96374 THER/PROPH/DIAG INJ IV PUSH: CPT

## 2023-05-26 PROCEDURE — 76870 US EXAM SCROTUM: CPT

## 2023-05-26 PROCEDURE — 87040 BLOOD CULTURE FOR BACTERIA: CPT

## 2023-05-26 PROCEDURE — 74177 CT ABD & PELVIS W/CONTRAST: CPT | Mod: MA

## 2023-05-26 PROCEDURE — 85610 PROTHROMBIN TIME: CPT

## 2023-05-26 PROCEDURE — 87077 CULTURE AEROBIC IDENTIFY: CPT

## 2023-05-26 PROCEDURE — 82947 ASSAY GLUCOSE BLOOD QUANT: CPT

## 2023-05-26 PROCEDURE — 71045 X-RAY EXAM CHEST 1 VIEW: CPT

## 2023-05-26 PROCEDURE — 81003 URINALYSIS AUTO W/O SCOPE: CPT

## 2023-05-26 PROCEDURE — 88305 TISSUE EXAM BY PATHOLOGIST: CPT

## 2023-05-26 PROCEDURE — 82435 ASSAY OF BLOOD CHLORIDE: CPT

## 2023-05-26 PROCEDURE — 85025 COMPLETE CBC W/AUTO DIFF WBC: CPT

## 2023-05-26 PROCEDURE — 88341 IMHCHEM/IMCYTCHM EA ADD ANTB: CPT

## 2023-05-26 PROCEDURE — 83605 ASSAY OF LACTIC ACID: CPT

## 2023-05-26 PROCEDURE — 87070 CULTURE OTHR SPECIMN AEROBIC: CPT

## 2023-05-26 PROCEDURE — 99285 EMERGENCY DEPT VISIT HI MDM: CPT | Mod: 25

## 2023-05-26 PROCEDURE — 84132 ASSAY OF SERUM POTASSIUM: CPT

## 2023-05-26 PROCEDURE — 80053 COMPREHEN METABOLIC PANEL: CPT

## 2023-05-26 PROCEDURE — 93975 VASCULAR STUDY: CPT

## 2023-05-26 PROCEDURE — 87205 SMEAR GRAM STAIN: CPT

## 2023-05-26 PROCEDURE — 82330 ASSAY OF CALCIUM: CPT

## 2023-05-26 PROCEDURE — 85018 HEMOGLOBIN: CPT

## 2023-05-26 PROCEDURE — 83036 HEMOGLOBIN GLYCOSYLATED A1C: CPT

## 2023-05-26 PROCEDURE — 86803 HEPATITIS C AB TEST: CPT

## 2023-05-26 PROCEDURE — 36415 COLL VENOUS BLD VENIPUNCTURE: CPT

## 2023-05-26 PROCEDURE — 96375 TX/PRO/DX INJ NEW DRUG ADDON: CPT

## 2023-05-26 PROCEDURE — 82962 GLUCOSE BLOOD TEST: CPT

## 2023-05-26 PROCEDURE — 87086 URINE CULTURE/COLONY COUNT: CPT

## 2023-05-26 PROCEDURE — 88312 SPECIAL STAINS GROUP 1: CPT

## 2023-05-26 PROCEDURE — 87075 CULTR BACTERIA EXCEPT BLOOD: CPT

## 2023-05-26 PROCEDURE — 85027 COMPLETE CBC AUTOMATED: CPT

## 2023-05-26 PROCEDURE — 85730 THROMBOPLASTIN TIME PARTIAL: CPT

## 2023-05-26 PROCEDURE — 84295 ASSAY OF SERUM SODIUM: CPT

## 2023-05-26 PROCEDURE — 80048 BASIC METABOLIC PNL TOTAL CA: CPT

## 2023-05-26 RX ORDER — INSULIN LISPRO 100/ML
6 VIAL (ML) SUBCUTANEOUS
Refills: 0 | Status: DISCONTINUED | OUTPATIENT
Start: 2023-05-26 | End: 2023-05-26

## 2023-05-26 RX ORDER — ACETAMINOPHEN 500 MG
2 TABLET ORAL
Qty: 0 | Refills: 0 | DISCHARGE
Start: 2023-05-26

## 2023-05-26 RX ORDER — INSULIN GLARGINE 100 [IU]/ML
15 INJECTION, SOLUTION SUBCUTANEOUS AT BEDTIME
Refills: 0 | Status: DISCONTINUED | OUTPATIENT
Start: 2023-05-26 | End: 2023-05-26

## 2023-05-26 RX ORDER — SENNA PLUS 8.6 MG/1
2 TABLET ORAL
Qty: 0 | Refills: 0 | DISCHARGE
Start: 2023-05-26

## 2023-05-26 RX ORDER — CIPROFLOXACIN LACTATE 400MG/40ML
1 VIAL (ML) INTRAVENOUS
Qty: 20 | Refills: 0
Start: 2023-05-26 | End: 2023-06-04

## 2023-05-26 RX ADMIN — LOSARTAN POTASSIUM 100 MILLIGRAM(S): 100 TABLET, FILM COATED ORAL at 05:03

## 2023-05-26 RX ADMIN — Medication 81 MILLIGRAM(S): at 11:31

## 2023-05-26 RX ADMIN — Medication 60 MILLIGRAM(S): at 05:02

## 2023-05-26 RX ADMIN — Medication 200 MILLIGRAM(S): at 05:04

## 2023-05-26 RX ADMIN — Medication 2: at 11:42

## 2023-05-26 RX ADMIN — Medication 5 UNIT(S): at 11:43

## 2023-05-26 RX ADMIN — Medication 2: at 08:18

## 2023-05-26 RX ADMIN — Medication 650 MILLIGRAM(S): at 00:33

## 2023-05-26 RX ADMIN — HEPARIN SODIUM 5000 UNIT(S): 5000 INJECTION INTRAVENOUS; SUBCUTANEOUS at 05:02

## 2023-05-26 RX ADMIN — PANTOPRAZOLE SODIUM 40 MILLIGRAM(S): 20 TABLET, DELAYED RELEASE ORAL at 06:02

## 2023-05-26 RX ADMIN — OXYCODONE HYDROCHLORIDE 5 MILLIGRAM(S): 5 TABLET ORAL at 11:36

## 2023-05-26 RX ADMIN — Medication 650 MILLIGRAM(S): at 11:32

## 2023-05-26 RX ADMIN — Medication 5 UNIT(S): at 08:19

## 2023-05-26 RX ADMIN — Medication 650 MILLIGRAM(S): at 01:03

## 2023-05-26 RX ADMIN — Medication 650 MILLIGRAM(S): at 06:02

## 2023-05-26 RX ADMIN — Medication 650 MILLIGRAM(S): at 05:02

## 2023-05-26 RX ADMIN — Medication 650 MILLIGRAM(S): at 12:30

## 2023-05-26 RX ADMIN — OXYCODONE HYDROCHLORIDE 5 MILLIGRAM(S): 5 TABLET ORAL at 12:30

## 2023-05-26 NOTE — DISCHARGE NOTE PROVIDER - NSDCMRMEDTOKEN_GEN_ALL_CORE_FT
acetaminophen 325 mg oral tablet: 2 tab(s) orally every 6 hours as needed for  mild pain  Aspirin Enteric Coated 81 mg oral delayed release tablet: 1 orally once a day  azelastine 137 mcg/inh (0.1%) nasal spray: 2 intranasally once a day  glipizide-metformin 5 mg-500 mg oral tablet: 2 tab(s) orally 2 times a day  hydroCHLOROthiazide 25 mg oral tablet: 1 orally once a day  Jardiance 25 mg oral tablet: 1 orally once a day  NIFEdipine 60 mg oral tablet, extended release: 1 orally once a day  olmesartan 40 mg oral tablet: 1 orally once a day  omeprazole 20 mg oral delayed release tablet: 1 orally once a day  senna leaf extract oral tablet: 2 tab(s) orally once a day (at bedtime)  simvastatin 20 mg oral tablet: 1 orally once a day (at bedtime)  tamsulosin 0.4 mg oral capsule: 1 orally once a day (at bedtime)  Vascepa 1 g oral capsule: 2 orally once a day  zolpidem 5 mg oral tablet: 1 orally once a day (at bedtime)   acetaminophen 325 mg oral tablet: 2 tab(s) orally every 6 hours as needed for  mild pain  Aspirin Enteric Coated 81 mg oral delayed release tablet: 1 orally once a day  azelastine 137 mcg/inh (0.1%) nasal spray: 2 intranasally once a day  Cipro 500 mg oral tablet: 1 tab(s) orally 2 times a day  glipizide-metformin 5 mg-500 mg oral tablet: 2 tab(s) orally 2 times a day  hydroCHLOROthiazide 25 mg oral tablet: 1 orally once a day  Jardiance 25 mg oral tablet: 1 orally once a day  NIFEdipine 60 mg oral tablet, extended release: 1 orally once a day  olmesartan 40 mg oral tablet: 1 orally once a day  omeprazole 20 mg oral delayed release tablet: 1 orally once a day  senna leaf extract oral tablet: 2 tab(s) orally once a day (at bedtime)  simvastatin 20 mg oral tablet: 1 orally once a day (at bedtime)  tamsulosin 0.4 mg oral capsule: 1 orally once a day (at bedtime)  Vascepa 1 g oral capsule: 2 orally once a day  zolpidem 5 mg oral tablet: 1 orally once a day (at bedtime)

## 2023-05-26 NOTE — DISCHARGE NOTE NURSING/CASE MANAGEMENT/SOCIAL WORK - NSDCPEFALRISK_GEN_ALL_CORE
For information on Fall & Injury Prevention, visit: [email or web address] OR  [email or web address] OR  https://www.cdc.gov/steadi/patient.html

## 2023-05-26 NOTE — DISCHARGE NOTE PROVIDER - NSDCCPCAREPLAN_GEN_ALL_CORE_FT
PRINCIPAL DISCHARGE DIAGNOSIS  Diagnosis: Pyocele  Assessment and Plan of Treatment: You had the right testicle removed for a bad infection.  There is a wound on the right side of the scrotum that will heal up over time.  Place a clean dressing over the wound daily and take the antibiotics as prescribed.  Be sure to keep your sugar levels under control to maximize wound healing and for infection prevention.  Notify the office if you develop any fevers 101F or greater, intractable pain/nausea/vomiting, or any wound discharge.  Follow up in the office with Dr Villalta..........      SECONDARY DISCHARGE DIAGNOSES  Diagnosis: Diabetes mellitus  Assessment and Plan of Treatment:       Diagnosis: HTN (hypertension)  Assessment and Plan of Treatment: Take your blood pressure medications daily and follow up routinely with your primary care doctor.      Diagnosis: Colon cancer  Assessment and Plan of Treatment: Follow up routinely with your colorectal surgeon    Diagnosis: History of implantation of penile prosthesis  Assessment and Plan of Treatment: Follow up routinely wiht your Urologist, or you may follow up with one fo our urologists at the [facility] at [phone]     PRINCIPAL DISCHARGE DIAGNOSIS  Diagnosis: Pyocele  Assessment and Plan of Treatment: You had the right testicle removed for a bad infection.  There is a wound on the right side of the scrotum that will heal up over time.  Place a clean dressing over the wound daily and take the antibiotics as prescribed.  Be sure to keep your sugar levels under control to maximize wound healing and for infection prevention.  Notify the office if you develop any fevers 101F or greater, intractable pain/nausea/vomiting, or any wound discharge.  Follow up in the office with Dr Jose A Brantley within 2 weeks to have the wound looked at.  Call [phone] to set up an appointment.      SECONDARY DISCHARGE DIAGNOSES  Diagnosis: Diabetes mellitus  Assessment and Plan of Treatment: Continue taking  your diabetic medication regularly.  Be sure to check your fingersticks 3x/day and before bedtime.  You must follow up with your endocrinologist at your appointment in 4 weeks.  Your A1C level was high at 10.8 and you likely need to be started on home insulin.      Diagnosis: HTN (hypertension)  Assessment and Plan of Treatment: Take your blood pressure medications daily and follow up routinely with your primary care doctor.      Diagnosis: Colon cancer  Assessment and Plan of Treatment: Follow up routinely with your colorectal surgeon    Diagnosis: History of implantation of penile prosthesis  Assessment and Plan of Treatment: Follow up routinely wiht your Urologist, or you may follow up with one fo our urologists at the [facility] at [phone]

## 2023-05-26 NOTE — DISCHARGE NOTE PROVIDER - CARE PROVIDER_API CALL
Jose A Brantley)  Urology  [address] [place] [address], [place] [address]  Phone: [phone]  Fax: [phone]  Follow Up Time: 2 weeks

## 2023-05-26 NOTE — DISCHARGE NOTE PROVIDER - CARE PROVIDERS DIRECT ADDRESSES
[email or web address]

## 2023-05-26 NOTE — CONSULT NOTE ADULT - SUBJECTIVE AND OBJECTIVE BOX
HPI:  72y old Male with PMHx of HTN, DM, colon ca s/p resection, who presents to the ER with worsening pain from his right testicle.  The patient states that he developed redness, swelling, and pain in his right scrotum 3 weeks ago and saw his urologist, Dr Mendoza, who took a urine culture and started him on Keflex for 10 days.  Urine culture grew ecoli.  Despite finishing his course of antibiotics, the patient had persistent right testicular pain and came to the ER.  States that he had some hematuria when the symptoms began.  He denies fevers, chills, N/V, flank pain, dysuria. (22 May 2023 18:41)      Patient has history of diabetes, A1C 10.8 % on home oral DM meds, jardiance and glip-metformin.   Endo was consulted for glycemic control.      PAST MEDICAL & SURGICAL HISTORY:  Colon cancer      High blood pressure      Diabetes      History of implantation of penile prosthesis          FAMILY HISTORY:      Social History:  speaks Icelandic (22 May 2023 18:41)            HOME MEDICATIONS:  Home Medications:  acetaminophen 325 mg oral tablet: 2 tab(s) orally every 6 hours as needed for  mild pain (26 May 2023 07:59)  Aspirin Enteric Coated 81 mg oral delayed release tablet: 1 orally once a day (22 May 2023 20:04)  azelastine 137 mcg/inh (0.1%) nasal spray: 2 intranasally once a day (22 May 2023 20:05)  glipizide-metformin 5 mg-500 mg oral tablet: 2 tab(s) orally 2 times a day (22 May 2023 20:09)  hydroCHLOROthiazide 25 mg oral tablet: 1 orally once a day (22 May 2023 20:08)  Jardiance 25 mg oral tablet: 1 orally once a day (22 May 2023 20:10)  NIFEdipine 60 mg oral tablet, extended release: 1 orally once a day (22 May 2023 20:08)  olmesartan 40 mg oral tablet: 1 orally once a day (22 May 2023 20:06)  omeprazole 20 mg oral delayed release tablet: 1 orally once a day (22 May 2023 20:06)  senna leaf extract oral tablet: 2 tab(s) orally once a day (at bedtime) (26 May 2023 07:59)  simvastatin 20 mg oral tablet: 1 orally once a day (at bedtime) (22 May 2023 20:08)  tamsulosin 0.4 mg oral capsule: 1 orally once a day (at bedtime) (22 May 2023 20:07)  Vascepa 1 g oral capsule: 2 orally once a day (22 May 2023 20:09)  zolpidem 5 mg oral tablet: 1 orally once a day (at bedtime) (22 May 2023 20:10)            MEDICATIONS  (STANDING):  acetaminophen     Tablet .. 650 milliGRAM(s) Oral every 6 hours  aspirin enteric coated 81 milliGRAM(s) Oral daily  ciprofloxacin   IVPB 400 milliGRAM(s) IV Intermittent every 12 hours  dextrose 5%. 1000 milliLiter(s) (100 mL/Hr) IV Continuous <Continuous>  dextrose 5%. 1000 milliLiter(s) (50 mL/Hr) IV Continuous <Continuous>  dextrose 50% Injectable 25 Gram(s) IV Push once  dextrose 50% Injectable 12.5 Gram(s) IV Push once  dextrose 50% Injectable 25 Gram(s) IV Push once  glucagon  Injectable 1 milliGRAM(s) IntraMuscular once  heparin   Injectable 5000 Unit(s) SubCutaneous every 8 hours  hydrochlorothiazide 25 milliGRAM(s) Oral daily  insulin glargine Injectable (LANTUS) 15 Unit(s) SubCutaneous at bedtime  insulin lispro (ADMELOG) corrective regimen sliding scale   SubCutaneous at bedtime  insulin lispro Injectable (ADMELOG) 6 Unit(s) SubCutaneous three times a day before meals  losartan 100 milliGRAM(s) Oral daily  melatonin 3 milliGRAM(s) Oral at bedtime  NIFEdipine XL 60 milliGRAM(s) Oral daily  pantoprazole    Tablet 40 milliGRAM(s) Oral before breakfast  polyethylene glycol 3350 17 Gram(s) Oral daily  senna 2 Tablet(s) Oral at bedtime  simvastatin 20 milliGRAM(s) Oral at bedtime  tamsulosin 0.4 milliGRAM(s) Oral at bedtime    MEDICATIONS  (PRN):  dextrose Oral Gel 15 Gram(s) Oral once PRN Blood Glucose LESS THAN 70 milliGRAM(s)/deciliter  oxyCODONE    IR 5 milliGRAM(s) Oral every 4 hours PRN Moderate Pain (4 - 6)  oxyCODONE    IR 10 milliGRAM(s) Oral every 6 hours PRN Severe Pain (7 - 10)  zolpidem 5 milliGRAM(s) Oral at bedtime PRN Insomnia      Allergies    Peaches (Pruritus)  Soybean (Pruritus)  No Known Drug Allergies    Intolerances        Review of Systems:  Neuro: No HA, no dizziness  Cardiovascular: No chest pain, no palpitations  Respiratory: no SOB, no cough  GI: No nausea, vomiting, abdominal pain  MSK: Denies joint/muscle pain      ALL OTHER SYSTEMS REVIEWED AND NEGATIVE    PHYSICAL EXAM:  VITALS: T(C): 36.9 (05-26-23 @ 12:39)  T(F): 98.4 (05-26-23 @ 12:39), Max: 98.4 (05-26-23 @ 04:48)  HR: 65 (05-26-23 @ 12:39) (54 - 71)  BP: 100/56 (05-26-23 @ 12:39) (100/56 - 139/71)  RR:  (18 - 18)  SpO2:  (94% - 97%)  Wt(kg): --  GENERAL: NAD, well-groomed, well-developed  NEURO:  alert and oriented  RESPIRATORY: Clear to auscultation bilaterally; No rales, rhonchi, wheezing  CARDIOVASCULAR: Si S2  GI: Soft, non distended, normal bowel sounds  MUSCULOSKELETAL: Moves all extremities equally       POCT Blood Glucose.: 200 mg/dL (05-26-23 @ 11:35)  POCT Blood Glucose.: 192 mg/dL (05-26-23 @ 08:11)  POCT Blood Glucose.: 332 mg/dL (05-25-23 @ 21:13)  POCT Blood Glucose.: 159 mg/dL (05-25-23 @ 17:21)  POCT Blood Glucose.: 280 mg/dL (05-25-23 @ 12:16)  POCT Blood Glucose.: 218 mg/dL (05-25-23 @ 09:29)  POCT Blood Glucose.: 310 mg/dL (05-25-23 @ 00:52)  POCT Blood Glucose.: 351 mg/dL (05-24-23 @ 23:21)  POCT Blood Glucose.: 416 mg/dL (05-24-23 @ 22:13)  POCT Blood Glucose.: 442 mg/dL (05-24-23 @ 21:22)  POCT Blood Glucose.: 220 mg/dL (05-24-23 @ 16:58)  POCT Blood Glucose.: 217 mg/dL (05-24-23 @ 15:09)  POCT Blood Glucose.: 142 mg/dL (05-24-23 @ 11:09)  POCT Blood Glucose.: 220 mg/dL (05-24-23 @ 06:12)  POCT Blood Glucose.: 285 mg/dL (05-23-23 @ 21:28)  POCT Blood Glucose.: 274 mg/dL (05-23-23 @ 17:24)                            10.8   6.91  )-----------( 149      ( 25 May 2023 07:14 )             32.6       05-25    139  |  101  |  21  ----------------------------<  180<H>  4.2   |  24  |  1.06    eGFR: 75    Ca    9.1      05-25        Thyroid Function Tests:    Diet, Consistent Carbohydrate/No Snacks (05-24-23 @ 21:40) [Active]          A1C with Estimated Average Glucose Result: 10.8 % (05-23-23 @ 07:43)                     HPI:  72y old Male with PMHx of HTN, DM, colon ca s/p resection, who presents to the ER with worsening pain from his right testicle.  The patient states that he developed redness,  swelling, and pain in his right scrotum 3 weeks ago and saw his urologist, Dr Mendoza, who took a urine culture and started him on Keflex for 10 days.  Urine culture grew ecoli.  Despite finishing his course of antibiotics, the patient had persistent right testicular pain and came to the ER.  States that he had some hematuria when the symptoms began.  He denies fevers, chills, N/V, flank pain, dysuria. (22 May 2023 18:41)      Patient has history of diabetes, A1C 10.8 % on home oral DM meds, jardiance and glip-metformin.   Endo was consulted for glycemic control.      PAST MEDICAL & SURGICAL HISTORY:  Colon cancer      High blood pressure      Diabetes      History of implantation of penile prosthesis          FAMILY HISTORY:      Social History:  speaks Romanian (22 May 2023 18:41)            HOME MEDICATIONS:  Home Medications:  acetaminophen 325 mg oral tablet: 2 tab(s) orally every 6 hours as needed for  mild pain (26 May 2023 07:59)  Aspirin Enteric Coated 81 mg oral delayed release tablet: 1 orally once a day (22 May 2023 20:04)  azelastine 137 mcg/inh (0.1%) nasal spray: 2 intranasally once a day (22 May 2023 20:05)  glipizide-metformin 5 mg-500 mg oral tablet: 2 tab(s) orally 2 times a day (22 May 2023 20:09)  hydroCHLOROthiazide 25 mg oral tablet: 1 orally once a day (22 May 2023 20:08)  Jardiance 25 mg oral tablet: 1 orally once a day (22 May 2023 20:10)  NIFEdipine 60 mg oral tablet, extended release: 1 orally once a day (22 May 2023 20:08)  olmesartan 40 mg oral tablet: 1 orally once a day (22 May 2023 20:06)  omeprazole 20 mg oral delayed release tablet: 1 orally once a day (22 May 2023 20:06)  senna leaf extract oral tablet: 2 tab(s) orally once a day (at bedtime) (26 May 2023 07:59)  simvastatin 20 mg oral tablet: 1 orally once a day (at bedtime) (22 May 2023 20:08)  tamsulosin 0.4 mg oral capsule: 1 orally once a day (at bedtime) (22 May 2023 20:07)  Vascepa 1 g oral capsule: 2 orally once a day (22 May 2023 20:09)  zolpidem 5 mg oral tablet: 1 orally once a day (at bedtime) (22 May 2023 20:10)            MEDICATIONS  (STANDING):  acetaminophen     Tablet .. 650 milliGRAM(s) Oral every 6 hours  aspirin enteric coated 81 milliGRAM(s) Oral daily  ciprofloxacin   IVPB 400 milliGRAM(s) IV Intermittent every 12 hours  dextrose 5%. 1000 milliLiter(s) (100 mL/Hr) IV Continuous <Continuous>  dextrose 5%. 1000 milliLiter(s) (50 mL/Hr) IV Continuous <Continuous>  dextrose 50% Injectable 25 Gram(s) IV Push once  dextrose 50% Injectable 12.5 Gram(s) IV Push once  dextrose 50% Injectable 25 Gram(s) IV Push once  glucagon  Injectable 1 milliGRAM(s) IntraMuscular once  heparin   Injectable 5000 Unit(s) SubCutaneous every 8 hours  hydrochlorothiazide 25 milliGRAM(s) Oral daily  insulin glargine Injectable (LANTUS) 15 Unit(s) SubCutaneous at bedtime  insulin lispro (ADMELOG) corrective regimen sliding scale   SubCutaneous at bedtime  insulin lispro Injectable (ADMELOG) 6 Unit(s) SubCutaneous three times a day before meals  losartan 100 milliGRAM(s) Oral daily  melatonin 3 milliGRAM(s) Oral at bedtime  NIFEdipine XL 60 milliGRAM(s) Oral daily  pantoprazole    Tablet 40 milliGRAM(s) Oral before breakfast  polyethylene glycol 3350 17 Gram(s) Oral daily  senna 2 Tablet(s) Oral at bedtime  simvastatin 20 milliGRAM(s) Oral at bedtime  tamsulosin 0.4 milliGRAM(s) Oral at bedtime    MEDICATIONS  (PRN):  dextrose Oral Gel 15 Gram(s) Oral once PRN Blood Glucose LESS THAN 70 milliGRAM(s)/deciliter  oxyCODONE    IR 5 milliGRAM(s) Oral every 4 hours PRN Moderate Pain (4 - 6)  oxyCODONE    IR 10 milliGRAM(s) Oral every 6 hours PRN Severe Pain (7 - 10)  zolpidem 5 milliGRAM(s) Oral at bedtime PRN Insomnia      Allergies    Peaches (Pruritus)  Soybean (Pruritus)  No Known Drug Allergies    Intolerances        Review of Systems:  Neuro: No HA, no dizziness  Cardiovascular: No chest pain, no palpitations  Respiratory: no SOB, no cough  GI: No nausea, vomiting, abdominal pain  MSK: Denies joint/muscle pain      ALL OTHER SYSTEMS REVIEWED AND NEGATIVE    PHYSICAL EXAM:  VITALS: T(C): 36.9 (05-26-23 @ 12:39)  T(F): 98.4 (05-26-23 @ 12:39), Max: 98.4 (05-26-23 @ 04:48)  HR: 65 (05-26-23 @ 12:39) (54 - 71)  BP: 100/56 (05-26-23 @ 12:39) (100/56 - 139/71)  RR:  (18 - 18)  SpO2:  (94% - 97%)  Wt(kg): --  GENERAL: NAD, well-groomed, well-developed  NEURO:  alert and oriented  RESPIRATORY: Clear to auscultation bilaterally; No rales, rhonchi, wheezing  CARDIOVASCULAR: Si S2  GI: Soft, non distended, normal bowel sounds  MUSCULOSKELETAL: Moves all extremities equally       POCT Blood Glucose.: 200 mg/dL (05-26-23 @ 11:35)  POCT Blood Glucose.: 192 mg/dL (05-26-23 @ 08:11)  POCT Blood Glucose.: 332 mg/dL (05-25-23 @ 21:13)  POCT Blood Glucose.: 159 mg/dL (05-25-23 @ 17:21)  POCT Blood Glucose.: 280 mg/dL (05-25-23 @ 12:16)  POCT Blood Glucose.: 218 mg/dL (05-25-23 @ 09:29)  POCT Blood Glucose.: 310 mg/dL (05-25-23 @ 00:52)  POCT Blood Glucose.: 351 mg/dL (05-24-23 @ 23:21)  POCT Blood Glucose.: 416 mg/dL (05-24-23 @ 22:13)  POCT Blood Glucose.: 442 mg/dL (05-24-23 @ 21:22)  POCT Blood Glucose.: 220 mg/dL (05-24-23 @ 16:58)  POCT Blood Glucose.: 217 mg/dL (05-24-23 @ 15:09)  POCT Blood Glucose.: 142 mg/dL (05-24-23 @ 11:09)  POCT Blood Glucose.: 220 mg/dL (05-24-23 @ 06:12)  POCT Blood Glucose.: 285 mg/dL (05-23-23 @ 21:28)  POCT Blood Glucose.: 274 mg/dL (05-23-23 @ 17:24)                            10.8   6.91  )-----------( 149      ( 25 May 2023 07:14 )             32.6       05-25    139  |  101  |  21  ----------------------------<  180<H>  4.2   |  24  |  1.06    eGFR: 75    Ca    9.1      05-25        Thyroid Function Tests:    Diet, Consistent Carbohydrate/No Snacks (05-24-23 @ 21:40) [Active]          A1C with Estimated Average Glucose Result: 10.8 % (05-23-23 @ 07:43)

## 2023-05-26 NOTE — CONSULT NOTE ADULT - PROBLEM SELECTOR RECOMMENDATION 9
Will increase Lantus to 15 units at bed time.  Will increase Admelog to 6 units before each meal in addition to Admelog correction scale coverage.  Will continue monitoring FS, log, and glucose trends, will Follow up.  Patient counseled for compliance with consistent low carb diet and exercise as tolerated outpatient.     DC recs:  Pt has appointment with Endocrinologist next month, reluctant to start home insulin now.   Pt can be discharged on his home DM regimen. Glip-metformin and jardiance.   Follow up in 4 weeks.

## 2023-05-26 NOTE — CONSULT NOTE ADULT - ASSESSMENT
72y Male pmHx  HTN, DM, colon ca s/p resection, now s/p scrotal exploration, right orchiectomy on 5/24.      Assessment  DMT2: 72y Male with DM T2 with hyperglycemia, A1C 10.8% , was on oral meds at home, now on basal bolus insulin with coverage, blood sugars running high. Insulin adjusted.   HTN: on antihypertensive medications, monitored, asymptomatic.  Scrotal pain: s/p R orchiectomy on 5/24 w urology.       Discussed plan and management wit Dr Melquiades Mott NP - TEAMS  Mumtaz Arnett MD  Cell: [phone]  Office: [phone]             72y Male pmHx  HTN, DM, colon ca s/p resection, now s/p scrotal exploration, right orchiectomy on 5/24.      Assessment  DMT2: 72y Male with DM T2 with hyperglycemia, A1C 10.8% , was on oral meds at home, now on basal bolus insulin with coverage, blood sugars  running high. Insulin adjusted.   HTN: on antihypertensive medications, monitored, asymptomatic.  Scrotal pain: s/p R orchiectomy on 5/24 w urology.       Discussed plan and management wit Dr Melquiades Mott NP - TEAMS  Mumtaz Arnett MD  Cell: [phone]  Office: [phone]

## 2023-05-26 NOTE — PROGRESS NOTE ADULT - ASSESSMENT
A/P: 72y Male s/p scrotal exploration, right orchiectomy ,5/24    DVT prophylaxis/OOB  Incentive spirometry  Strict I&O's  Analgesia and antiemetics as needed  Diet-regular     penrose removed   anticipate discharge with cipro today  will need final dm medication reccs.

## 2023-05-26 NOTE — DISCHARGE NOTE PROVIDER - HOSPITAL COURSE
This is a 72 year old me who was admitted for finding of a right epididymitis and a 4cm pyocele.  He was started on IV cipro for more than 24 hours and was take to the OR for scrotal exploration, right orchiectomy on 5/24 and was left with a penrose drain afterwards.  The patient remained hemodynamically stable throughout his admission.  POD#2, his penrose drain was removed and he was discharged home with appropriate instructions for follow up.  Prior to discharge, that patient was ambulating, pain controlled, having GI function, and tolerating a regular diet. This is a 72 year old me who was admitted for finding of a right epididymitis and a 4cm pyocele.  He was started on IV cipro for more than 24 hours and was take to the OR for scrotal exploration, right orchiectomy on 5/24 and was left with a penrose drain afterwards.  The patient remained hemodynamically stable throughout his admission.  POD#2, his penrose drain was removed and he was discharged home with appropriate instructions for follow up.  Prior to discharge, that patient was ambulating, pain controlled, having GI function, and tolerating a consistent carbohydrate diet.  Of note, he was seen and evaluated by endocrinology for high fingersticks and A1C of 10.8.  They recommended that he start home insulin but the patient refused, so he was discharged to resume his oral regimen and to follow up with his endocrinologist in 4 weeks at his scheduled appointment. This is a 72 year old me who was admitted for finding of a right epididymitis and a 4cm pyocele.  He was started on IV cipro for more than 24 hours and was take to the OR for scrotal exploration, right orchiectomy on 5/24 and was left with a penrose drain afterwards.  The patient remained hemodynamically stable throughout his admission.  POD#2, his penrose drain was removed and he was discharged home with appropriate instructions for follow up.  Prior to discharge, that patient was ambulating, pain controlled, having GI function, and tolerating a consistent carbohydrate diet.  Of note, he was seen and evaluated by endocrinology for high fingersticks and A1C of 10.8.  They recommended that he start home insulin but the patient refused, so he was discharged to resume his oral regimen and to follow up with his endocrinologist in 4 weeks at his scheduled appointment.    Pt developed lactic acidosis likely from infection

## 2023-05-26 NOTE — DISCHARGE NOTE NURSING/CASE MANAGEMENT/SOCIAL WORK - PATIENT PORTAL LINK FT
You can access the FollowMyHealth Patient Portal offered by [facility] by registering at the following website: [email or web address]. By joining [facility]’s FollowMyHealth portal, you will also be able to view your health information using other applications (apps) compatible with our system.

## 2023-05-26 NOTE — CONSULT NOTE ADULT - NS ATTEND AMEND GEN_ALL_CORE FT
Chart, labs, vitals, radiology reviewed. Above H&P reviewed and edited where appropriate. Agree with history and physical exam. Agree with assessment and plan. I reviewed the overnight course of events and discussed the care with the patient/ family.  All the decisions in assessment and plan are solely made by me.. no

## 2023-05-26 NOTE — PROGRESS NOTE ADULT - SUBJECTIVE AND OBJECTIVE BOX
Subjective  without complaints   Objective    Vital signs  T(F): , Max: 99 (05-25-23 @ 12:45)  HR: 56 (05-26-23 @ 04:48)  BP: 128/75 (05-26-23 @ 04:48)  SpO2: 97% (05-26-23 @ 04:48)  Wt(kg): --    Output     05-25 @ 07:01  -  05-26 @ 07:00  --------------------------------------------------------  IN: 1620 mL / OUT: 2820 mL / NET: -1200 mL        Gen awake alert nad axox3  Abd flat soft ntnd   Back no cvat bl    fluffs in place, incision c/d/i penrose removed   no discharge     Labs      05-25 @ 07:14    WBC 6.91  / Hct 32.6  / SCr 1.06       Urine Cx: Culture - Body Fluid with Gram Stain (05.24.23 @ 16:29)    Gram Stain:   Moderate polymorphonuclear leukocytes per low power field  No organisms seen   Specimen Source: .Body Fluid right pyocele fluid   Culture Results:   Rare Escherichia coli    Imaging  < from: US Doppler Scrotum (05.22.23 @ 14:34) >  FINDINGS:      RIGHT:    Right testis: 3x 2 x 1.7 cm. Normal echogenicity and echotexture with no   masses or areas of architectural distortion. Mildly increased   vascularity/hyperemia.    Right epididymis: Edematous and hyperemic tail.    Right hydrocele: A 3.8 x 2.4 x 4 cm complicated fluid collection in the   right hemiscrotum with peripheral hyperemia impinging on the right testis   and epididymis, compatible with a pyocele.    Right varicocele: None.    LEFT:    Left testis: 3.2 x 1.8 x 2 cm. Normal echogenicity and echotexture with   no masses or areas of architectural distortion. Normal blood flow pattern.    Left epididymis: Within normal limits.    Left hydrocele: None.    Left varicocele: None.    Partially imaged penile prosthesis.    IMPRESSION:    Right epididymoorchitis with a 4 cm pyocele.    < end of copied text >

## 2023-05-27 LAB
-  AMIKACIN: SIGNIFICANT CHANGE UP
-  AMIKACIN: SIGNIFICANT CHANGE UP
-  AMOXICILLIN/CLAVULANIC ACID: SIGNIFICANT CHANGE UP
-  AMOXICILLIN/CLAVULANIC ACID: SIGNIFICANT CHANGE UP
-  AMPICILLIN/SULBACTAM: SIGNIFICANT CHANGE UP
-  AMPICILLIN/SULBACTAM: SIGNIFICANT CHANGE UP
-  AMPICILLIN: SIGNIFICANT CHANGE UP
-  AMPICILLIN: SIGNIFICANT CHANGE UP
-  AZTREONAM: SIGNIFICANT CHANGE UP
-  AZTREONAM: SIGNIFICANT CHANGE UP
-  CEFAZOLIN: SIGNIFICANT CHANGE UP
-  CEFAZOLIN: SIGNIFICANT CHANGE UP
-  CEFEPIME: SIGNIFICANT CHANGE UP
-  CEFEPIME: SIGNIFICANT CHANGE UP
-  CEFOXITIN: SIGNIFICANT CHANGE UP
-  CEFOXITIN: SIGNIFICANT CHANGE UP
-  CEFTRIAXONE: SIGNIFICANT CHANGE UP
-  CEFTRIAXONE: SIGNIFICANT CHANGE UP
-  CIPROFLOXACIN: SIGNIFICANT CHANGE UP
-  CIPROFLOXACIN: SIGNIFICANT CHANGE UP
-  ERTAPENEM: SIGNIFICANT CHANGE UP
-  ERTAPENEM: SIGNIFICANT CHANGE UP
-  GENTAMICIN: SIGNIFICANT CHANGE UP
-  GENTAMICIN: SIGNIFICANT CHANGE UP
-  IMIPENEM: SIGNIFICANT CHANGE UP
-  IMIPENEM: SIGNIFICANT CHANGE UP
-  LEVOFLOXACIN: SIGNIFICANT CHANGE UP
-  LEVOFLOXACIN: SIGNIFICANT CHANGE UP
-  MEROPENEM: SIGNIFICANT CHANGE UP
-  MEROPENEM: SIGNIFICANT CHANGE UP
-  PIPERACILLIN/TAZOBACTAM: SIGNIFICANT CHANGE UP
-  PIPERACILLIN/TAZOBACTAM: SIGNIFICANT CHANGE UP
-  TOBRAMYCIN: SIGNIFICANT CHANGE UP
-  TOBRAMYCIN: SIGNIFICANT CHANGE UP
-  TRIMETHOPRIM/SULFAMETHOXAZOLE: SIGNIFICANT CHANGE UP
-  TRIMETHOPRIM/SULFAMETHOXAZOLE: SIGNIFICANT CHANGE UP
CULTURE RESULTS: SIGNIFICANT CHANGE UP
CULTURE RESULTS: SIGNIFICANT CHANGE UP
METHOD TYPE: SIGNIFICANT CHANGE UP
METHOD TYPE: SIGNIFICANT CHANGE UP
SPECIMEN SOURCE: SIGNIFICANT CHANGE UP
SPECIMEN SOURCE: SIGNIFICANT CHANGE UP

## 2023-05-29 LAB
CULTURE RESULTS: SIGNIFICANT CHANGE UP
CULTURE RESULTS: SIGNIFICANT CHANGE UP
ORGANISM # SPEC MICROSCOPIC CNT: SIGNIFICANT CHANGE UP
SPECIMEN SOURCE: SIGNIFICANT CHANGE UP
SPECIMEN SOURCE: SIGNIFICANT CHANGE UP

## 2023-05-31 PROBLEM — E11.9 TYPE 2 DIABETES MELLITUS WITHOUT COMPLICATIONS: Chronic | Status: ACTIVE | Noted: 2023-05-22

## 2023-05-31 PROBLEM — C18.9 MALIGNANT NEOPLASM OF COLON, UNSPECIFIED: Chronic | Status: ACTIVE | Noted: 2023-05-22

## 2023-05-31 PROBLEM — I10 ESSENTIAL (PRIMARY) HYPERTENSION: Chronic | Status: ACTIVE | Noted: 2023-05-22

## 2023-06-09 LAB — SURGICAL PATHOLOGY STUDY: SIGNIFICANT CHANGE UP

## 2023-06-13 ENCOUNTER — APPOINTMENT (OUTPATIENT)
Dept: UROLOGY | Facility: CLINIC | Age: 73
End: 2023-06-13
Payer: MEDICARE

## 2023-06-13 VITALS
RESPIRATION RATE: 17 BRPM | WEIGHT: 145 LBS | HEIGHT: 64 IN | SYSTOLIC BLOOD PRESSURE: 93 MMHG | DIASTOLIC BLOOD PRESSURE: 57 MMHG | BODY MASS INDEX: 24.75 KG/M2 | HEART RATE: 71 BPM

## 2023-06-13 DIAGNOSIS — Z85.038 PERSONAL HISTORY OF OTHER MALIGNANT NEOPLASM OF LARGE INTESTINE: ICD-10-CM

## 2023-06-13 DIAGNOSIS — N49.2 INFLAMMATORY DISORDERS OF SCROTUM: ICD-10-CM

## 2023-06-13 DIAGNOSIS — I10 ESSENTIAL (PRIMARY) HYPERTENSION: ICD-10-CM

## 2023-06-13 DIAGNOSIS — E11.9 TYPE 2 DIABETES MELLITUS W/OUT COMPLICATIONS: ICD-10-CM

## 2023-06-13 PROCEDURE — 99213 OFFICE O/P EST LOW 20 MIN: CPT | Mod: 24

## 2023-06-13 RX ORDER — ATENOLOL 100 MG/1
100 TABLET ORAL
Refills: 0 | Status: ACTIVE | COMMUNITY

## 2023-06-13 RX ORDER — SITAGLIPTIN 100 MG/1
100 TABLET, FILM COATED ORAL
Refills: 0 | Status: ACTIVE | COMMUNITY

## 2023-06-13 RX ORDER — ICOSAPENT ETHYL 1000 MG/1
1 CAPSULE ORAL
Refills: 0 | Status: ACTIVE | COMMUNITY

## 2023-06-13 RX ORDER — HYDRALAZINE HYDROCHLORIDE 25 MG/1
25 TABLET ORAL
Refills: 0 | Status: ACTIVE | COMMUNITY

## 2023-06-13 RX ORDER — OMEPRAZOLE 20 MG/1
20 CAPSULE, DELAYED RELEASE ORAL
Refills: 0 | Status: ACTIVE | COMMUNITY

## 2023-06-13 RX ORDER — GLIPIZIDE AND METFORMIN HYDROCHLORIDE 5; 500 MG/1; MG/1
5-500 TABLET, FILM COATED ORAL
Refills: 0 | Status: ACTIVE | COMMUNITY

## 2023-06-13 RX ORDER — ASPIRIN 81 MG
81 TABLET, DELAYED RELEASE (ENTERIC COATED) ORAL
Refills: 0 | Status: ACTIVE | COMMUNITY

## 2023-06-13 RX ORDER — OLMESARTAN MEDOXOMIL 40 MG/1
40 TABLET, FILM COATED ORAL
Refills: 0 | Status: ACTIVE | COMMUNITY

## 2023-06-13 RX ORDER — SIMVASTATIN 20 MG/1
20 TABLET, FILM COATED ORAL
Refills: 0 | Status: ACTIVE | COMMUNITY

## 2023-06-13 RX ORDER — TAMSULOSIN HYDROCHLORIDE 0.4 MG/1
0.4 CAPSULE ORAL
Refills: 0 | Status: ACTIVE | COMMUNITY

## 2023-06-13 NOTE — PHYSICAL EXAM
[General Appearance - Well Developed] : well developed [General Appearance - Well Nourished] : well nourished [Normal Appearance] : normal appearance [Well Groomed] : well groomed [General Appearance - In No Acute Distress] : no acute distress [Edema] : no peripheral edema [Respiration, Rhythm And Depth] : normal respiratory rhythm and effort [Exaggerated Use Of Accessory Muscles For Inspiration] : no accessory muscle use [Urinary Bladder Findings] : the bladder was normal on palpation [Scrotum] : the scrotum was normal [FreeTextEntry1] : well healed R hemiscrotal incision.  Penile implant/pump - appears wnl.  No evidence for infection.  Cycles properly.  No L testis mass/tenderness [Normal Station and Gait] : the gait and station were normal for the patient's age [] : no rash [No Focal Deficits] : no focal deficits [Oriented To Time, Place, And Person] : oriented to person, place, and time [Affect] : the affect was normal [Mood] : the mood was normal [Not Anxious] : not anxious

## 2023-06-13 NOTE — HISTORY OF PRESENT ILLNESS
[FreeTextEntry1] : Patient is a 71 yo M who presents for R scrotal abscess s/p R simple orchiectomy, abscess drainage.\par Patient has history of hypertension,  diabetes, colon cancer and erectile dysfunction s/p penile prosthesis.  The patient presented to the ER in May 2023 with swelling and erythema and redness over the right hemiscrotum.  The patient reports that  he had UTI, few weeks ago that was treated with long course of antibiotics without resolution of her symptoms.  Ultrasound was done showed right pyelocele. The pump and penile implant on exam was not involved and there was no extension of infection on US to pump either.\par \par He underwent R simple orchiectomy and abscess drainage with Dr Fermin.  Here for follow up.  Doing well.  no pain or fever/chills.\par \par He has a regular urologist Dr Mendoza who he normally follows with.

## 2023-06-13 NOTE — ASSESSMENT
[FreeTextEntry1] : Patient is a 71 yo M who presents for R scrotal abscess s/p simple orchiectomy/abscess drainage.\par \par Implant was able to be avoided/salvaged.  On exam today he is well healing.\par Pt states he will follow up with his regular urologist Dr Mendoza in the future.\par F/u PRN

## (undated) DEVICE — SOL IRR POUR H2O 250ML

## (undated) DEVICE — BLADE SCALPEL SAFETYLOCK #10

## (undated) DEVICE — STAPLER SKIN VISI-STAT 35 WIDE

## (undated) DEVICE — SUT SOFSILK 0 30" V-20

## (undated) DEVICE — SYR CONTROL LUER LOK 10CC

## (undated) DEVICE — DRAPE TOWEL BLUE 17" X 24"

## (undated) DEVICE — SUSPENSORY WITH LEG STRAPS LARGE

## (undated) DEVICE — SUT SOFSILK 2-0 18" TIES

## (undated) DEVICE — GLV 7 PROTEXIS (WHITE)

## (undated) DEVICE — SUT POLYSORB 0 18" MP UNDYED

## (undated) DEVICE — DRSG STERISTRIPS 0.5 X 4"

## (undated) DEVICE — GLV 6.5 PROTEXIS (WHITE)

## (undated) DEVICE — BLADE SCALPEL SAFETYLOCK #15

## (undated) DEVICE — GLV 7.5 PROTEXIS (WHITE)

## (undated) DEVICE — DRSG CURITY GAUZE SPONGE 4 X 4" 12-PLY

## (undated) DEVICE — SPECIMEN CONTAINER 100ML

## (undated) DEVICE — SUT POLYSORB 3-0 30" V-20 UNDYED

## (undated) DEVICE — DRAIN PENROSE .25" X 18" LATEX

## (undated) DEVICE — DRAPE 1/2 SHEET 40X57"

## (undated) DEVICE — MEDICATION LABELS W MARKER

## (undated) DEVICE — SUT SOFSILK 2-0 18" C-15

## (undated) DEVICE — SUT BIOSYN 4-0 18" P-12

## (undated) DEVICE — SUSPENSORY WITH LEG STRAPS XL

## (undated) DEVICE — WARMING BLANKET UPPER ADULT

## (undated) DEVICE — POSITIONER FOAM EGG CRATE ULNAR 2PCS (PINK)

## (undated) DEVICE — VENODYNE/SCD SLEEVE CALF LARGE

## (undated) DEVICE — SUSPENSORY WITH LEG STRAPS MEDIUM

## (undated) DEVICE — PREP BETADINE KIT

## (undated) DEVICE — SOL IRR POUR NS 0.9% 500ML

## (undated) DEVICE — SUT SOFSILK 2-0 30" V-20

## (undated) DEVICE — PACK GENERAL MINOR

## (undated) DEVICE — SUT POLYSORB 2-0 30" V-20 UNDYED

## (undated) DEVICE — GLV 8 PROTEXIS (WHITE)